# Patient Record
Sex: MALE | Race: WHITE | Employment: FULL TIME | URBAN - NONMETROPOLITAN AREA
[De-identification: names, ages, dates, MRNs, and addresses within clinical notes are randomized per-mention and may not be internally consistent; named-entity substitution may affect disease eponyms.]

---

## 2024-04-03 ENCOUNTER — HOSPITAL ENCOUNTER (EMERGENCY)
Age: 29
Discharge: HOME | End: 2024-04-03
Payer: COMMERCIAL

## 2024-04-03 VITALS
SYSTOLIC BLOOD PRESSURE: 126 MMHG | DIASTOLIC BLOOD PRESSURE: 71 MMHG | RESPIRATION RATE: 16 BRPM | TEMPERATURE: 98.2 F | WEIGHT: 183 LBS | OXYGEN SATURATION: 96 % | HEART RATE: 112 BPM

## 2024-04-03 DIAGNOSIS — Z02.89 ENCOUNTER TO OBTAIN EXCUSE FROM WORK: ICD-10-CM

## 2024-04-03 DIAGNOSIS — F41.1 ANXIETY STATE: Primary | ICD-10-CM

## 2024-04-03 PROCEDURE — 99203 OFFICE O/P NEW LOW 30 MIN: CPT

## 2024-04-03 PROCEDURE — 99213 OFFICE O/P EST LOW 20 MIN: CPT

## 2024-04-03 RX ORDER — DIVALPROEX SODIUM 125 MG/1
125 CAPSULE, COATED PELLETS ORAL 2 TIMES DAILY
COMMUNITY

## 2024-04-03 RX ORDER — BUSPIRONE HYDROCHLORIDE 10 MG/1
10 TABLET ORAL 2 TIMES DAILY
COMMUNITY
Start: 2024-03-04

## 2024-04-03 RX ORDER — HYDROXYZINE PAMOATE 25 MG/1
CAPSULE ORAL
COMMUNITY
Start: 2024-01-03

## 2024-04-03 ASSESSMENT — ENCOUNTER SYMPTOMS
CONSTIPATION: 0
VOMITING: 0
DIARRHEA: 0
SHORTNESS OF BREATH: 0
ABDOMINAL PAIN: 0

## 2024-04-03 ASSESSMENT — PAIN - FUNCTIONAL ASSESSMENT: PAIN_FUNCTIONAL_ASSESSMENT: NONE - DENIES PAIN

## 2024-04-03 NOTE — ED TRIAGE NOTES
Pt to room 2 and states he is need of a work note due to he is anxious and is out of medications for his mental illness and had an appointment at his doctor's office yesterday who \"wanted to admit me but I'm not going\".  \"I just need to get home so I can smoke marijuana because I's the only way I can be calm and get through this\". Pt denies any suicidal thoughts or plans currently but reports that he is having a hard time dealing with \"being human\"

## 2024-04-03 NOTE — ED PROVIDER NOTES
[facility]  Urgent Care Encounter      CHIEF COMPLAINT       Chief Complaint   Patient presents with    Letter for School/Work     anxiety       Nurses Notes reviewed and I agree except as noted in the HPI.  HISTORY OF PRESENT ILLNESS   Adalid Elmore is a 29 y.o. male who presents to urgent care requesting a work excuse.  Patient reports he needed a mental health day today.  Patient reports he called into work and is requesting a work note for excuse all.  Patient reports his anxiety has been very high and lately and he just needed a day off work.  Patient reports he did see his primary care provider yesterday and he did want him to be admitted to the hospital for psychiatric evaluation although patient reports hospitals give him more anxiety.  Patient states \"I am sick mentally.  Work does not need to know I am not physically sick.  I just need an excuse for today.  I have cash to pay for a work slip so I do not lose my job.\"    REVIEW OF SYSTEMS     Review of Systems   Constitutional:  Negative for fatigue and fever.   HENT:  Negative for congestion.    Respiratory:  Negative for cough and shortness of breath.    Gastrointestinal:  Negative for abdominal pain, constipation, diarrhea and vomiting.   Musculoskeletal:  Negative for arthralgias.   Neurological:  Negative for dizziness, seizures, numbness and headaches.   Psychiatric/Behavioral:  Negative for self-injury and suicidal ideas. The patient is nervous/anxious.        PAST MEDICAL HISTORY   History reviewed. No pertinent past medical history.    SURGICAL HISTORY     Patient  has no past surgical history on file.    CURRENT MEDICATIONS       Discharge Medication List as of 4/3/2024 12:29 PM        CONTINUE these medications which have NOT CHANGED    Details   divalproex (DEPAKOTE SPRINKLE) 125 MG DR capsule Take 1 capsule by mouth 2 times dailyHistorical Med      hydrOXYzine pamoate (VISTARIL) 25 MG capsule Historical Med       busPIRone (BUSPAR) 10 MG tablet Take 1 tablet by mouth 2 times dailyHistorical Med             ALLERGIES     Patient is is allergic to pcn [penicillins].    FAMILY HISTORY     Patient'sfamily history is not on file.    SOCIAL HISTORY     Patient  reports that he has been smoking cigarettes. He has never used smokeless tobacco. He reports current drug use. Drug: Marijuana (Weed).    PHYSICAL EXAM     ED TRIAGE VITALS  BP: 126/71, Temp: 98.2 °F (36.8 °C), Pulse: (!) 112, Respirations: 16, SpO2: 96 %  Physical Exam  Vitals and nursing note reviewed.   Constitutional:       Appearance: Normal appearance.   HENT:      Head: Normocephalic and atraumatic.      Nose: Nose normal.   Eyes:      Pupils: Pupils are equal, round, and reactive to light.   Cardiovascular:      Rate and Rhythm: Normal rate and regular rhythm.   Pulmonary:      Effort: Pulmonary effort is normal. No respiratory distress.      Breath sounds: Normal breath sounds. No stridor. No wheezing or rhonchi.   Skin:     General: Skin is warm and dry.      Capillary Refill: Capillary refill takes less than 2 seconds.   Neurological:      General: No focal deficit present.      Mental Status: He is alert and oriented to person, place, and time.   Psychiatric:         Attention and Perception: Attention normal. He is attentive. He does not perceive auditory or visual hallucinations.         Mood and Affect: Mood is anxious.         Behavior: Behavior is withdrawn. Behavior is not aggressive. Behavior is cooperative.         Thought Content: Thought content does not include homicidal or suicidal ideation. Thought content does not include homicidal or suicidal plan.         DIAGNOSTIC RESULTS   Labs:No results found for this visit on 04/03/24.    IMAGING:  No orders to display      URGENT CARE COURSE:     Vitals:    04/03/24 1215   BP: 126/71   Pulse: (!) 112   Resp: 16   Temp: 98.2 °F (36.8 °C)   TempSrc: Oral   SpO2: 96%   Weight: 83 kg (183 lb)

## 2024-04-08 ENCOUNTER — HOSPITAL ENCOUNTER (INPATIENT)
Age: 29
LOS: 7 days | Discharge: HOME | DRG: 753 | End: 2024-04-15
Attending: PSYCHIATRY & NEUROLOGY | Admitting: PSYCHIATRY & NEUROLOGY
Payer: COMMERCIAL

## 2024-04-08 PROBLEM — F41.9 ANXIETY DISORDER, UNSPECIFIED: Chronic | Status: ACTIVE | Noted: 2024-04-08

## 2024-04-08 PROBLEM — F31.9 BIPOLAR 1 DISORDER (HCC): Status: ACTIVE | Noted: 2024-04-08

## 2024-04-08 PROBLEM — F31.13 BIPOLAR I DISORDER, CURRENT OR MOST RECENT EPISODE MANIC, SEVERE (HCC): Status: ACTIVE | Noted: 2024-04-08

## 2024-04-08 PROBLEM — F12.20 CANNABIS USE DISORDER, MODERATE, DEPENDENCE (HCC): Status: ACTIVE | Noted: 2024-04-08

## 2024-04-08 LAB
T4 FREE SERPL-MCNC: 1.2 NG/DL (ref 0.93–1.68)
TSH SERPL DL<=0.005 MIU/L-ACNC: 2.62 UIU/ML (ref 0.4–4.2)

## 2024-04-08 PROCEDURE — 84439 ASSAY OF FREE THYROXINE: CPT

## 2024-04-08 PROCEDURE — 36415 COLL VENOUS BLD VENIPUNCTURE: CPT

## 2024-04-08 PROCEDURE — 1240000000 HC EMOTIONAL WELLNESS R&B

## 2024-04-08 PROCEDURE — 84443 ASSAY THYROID STIM HORMONE: CPT

## 2024-04-08 PROCEDURE — 6370000000 HC RX 637 (ALT 250 FOR IP): Performed by: PSYCHIATRY & NEUROLOGY

## 2024-04-08 RX ORDER — MAGNESIUM HYDROXIDE/ALUMINUM HYDROXICE/SIMETHICONE 120; 1200; 1200 MG/30ML; MG/30ML; MG/30ML
30 SUSPENSION ORAL EVERY 6 HOURS PRN
Status: DISCONTINUED | OUTPATIENT
Start: 2024-04-08 | End: 2024-04-15 | Stop reason: HOSPADM

## 2024-04-08 RX ORDER — POLYETHYLENE GLYCOL 3350 17 G/17G
17 POWDER, FOR SOLUTION ORAL DAILY PRN
Status: DISCONTINUED | OUTPATIENT
Start: 2024-04-08 | End: 2024-04-15 | Stop reason: HOSPADM

## 2024-04-08 RX ORDER — BUSPIRONE HYDROCHLORIDE 10 MG/1
10 TABLET ORAL 2 TIMES DAILY
Status: DISCONTINUED | OUTPATIENT
Start: 2024-04-08 | End: 2024-04-15 | Stop reason: HOSPADM

## 2024-04-08 RX ORDER — POLYETHYLENE GLYCOL 3350 17 G
2 POWDER IN PACKET (EA) ORAL
Status: DISCONTINUED | OUTPATIENT
Start: 2024-04-08 | End: 2024-04-15 | Stop reason: HOSPADM

## 2024-04-08 RX ORDER — LURASIDONE HYDROCHLORIDE 40 MG/1
60 TABLET, FILM COATED ORAL
Status: DISCONTINUED | OUTPATIENT
Start: 2024-04-08 | End: 2024-04-10

## 2024-04-08 RX ORDER — DIVALPROEX SODIUM 500 MG/1
1500 TABLET, EXTENDED RELEASE ORAL NIGHTLY
Status: ON HOLD | COMMUNITY
End: 2024-04-15 | Stop reason: HOSPADM

## 2024-04-08 RX ORDER — DIVALPROEX SODIUM 500 MG/1
1500 TABLET, EXTENDED RELEASE ORAL NIGHTLY
Status: DISCONTINUED | OUTPATIENT
Start: 2024-04-08 | End: 2024-04-15 | Stop reason: HOSPADM

## 2024-04-08 RX ORDER — IBUPROFEN 400 MG/1
400 TABLET ORAL EVERY 6 HOURS PRN
Status: DISCONTINUED | OUTPATIENT
Start: 2024-04-08 | End: 2024-04-15 | Stop reason: HOSPADM

## 2024-04-08 RX ORDER — ACETAMINOPHEN 325 MG/1
650 TABLET ORAL EVERY 4 HOURS PRN
Status: DISCONTINUED | OUTPATIENT
Start: 2024-04-08 | End: 2024-04-15 | Stop reason: HOSPADM

## 2024-04-08 RX ORDER — TRAZODONE HYDROCHLORIDE 50 MG/1
50 TABLET ORAL NIGHTLY PRN
Status: DISCONTINUED | OUTPATIENT
Start: 2024-04-08 | End: 2024-04-12

## 2024-04-08 RX ORDER — HYDROXYZINE HYDROCHLORIDE 25 MG/1
50 TABLET, FILM COATED ORAL 3 TIMES DAILY PRN
Status: DISCONTINUED | OUTPATIENT
Start: 2024-04-08 | End: 2024-04-15 | Stop reason: HOSPADM

## 2024-04-08 RX ADMIN — HYDROXYZINE HYDROCHLORIDE 50 MG: 25 TABLET, FILM COATED ORAL at 09:17

## 2024-04-08 RX ADMIN — TRAZODONE HYDROCHLORIDE 50 MG: 50 TABLET ORAL at 20:15

## 2024-04-08 RX ADMIN — BUSPIRONE HYDROCHLORIDE 10 MG: 10 TABLET ORAL at 13:10

## 2024-04-08 RX ADMIN — NICOTINE POLACRILEX 2 MG: 2 LOZENGE ORAL at 16:52

## 2024-04-08 RX ADMIN — NICOTINE POLACRILEX 2 MG: 2 LOZENGE ORAL at 15:30

## 2024-04-08 RX ADMIN — NICOTINE POLACRILEX 2 MG: 2 LOZENGE ORAL at 20:09

## 2024-04-08 RX ADMIN — NICOTINE POLACRILEX 2 MG: 2 LOZENGE ORAL at 09:17

## 2024-04-08 RX ADMIN — BUSPIRONE HYDROCHLORIDE 10 MG: 10 TABLET ORAL at 20:08

## 2024-04-08 RX ADMIN — DIVALPROEX SODIUM 1500 MG: 500 TABLET, EXTENDED RELEASE ORAL at 20:08

## 2024-04-08 RX ADMIN — HYDROXYZINE HYDROCHLORIDE 50 MG: 25 TABLET, FILM COATED ORAL at 16:22

## 2024-04-08 RX ADMIN — NICOTINE POLACRILEX 2 MG: 2 LOZENGE ORAL at 14:10

## 2024-04-08 ASSESSMENT — SLEEP AND FATIGUE QUESTIONNAIRES
DO YOU USE A SLEEP AID: YES
SLEEP PATTERN: INSOMNIA
AVERAGE NUMBER OF SLEEP HOURS: 0
DO YOU HAVE DIFFICULTY SLEEPING: YES

## 2024-04-08 ASSESSMENT — LIFESTYLE VARIABLES
HOW MANY STANDARD DRINKS CONTAINING ALCOHOL DO YOU HAVE ON A TYPICAL DAY: PATIENT DECLINED
HOW OFTEN DO YOU HAVE A DRINK CONTAINING ALCOHOL: PATIENT DECLINED

## 2024-04-08 ASSESSMENT — PATIENT HEALTH QUESTIONNAIRE - PHQ9
SUM OF ALL RESPONSES TO PHQ QUESTIONS 1-9: 0
2. FEELING DOWN, DEPRESSED OR HOPELESS: NOT AT ALL
SUM OF ALL RESPONSES TO PHQ QUESTIONS 1-9: 0

## 2024-04-09 LAB
BASOPHILS ABSOLUTE: 0 THOU/MM3 (ref 0–0.1)
BASOPHILS NFR BLD AUTO: 0.6 %
DEPRECATED RDW RBC AUTO: 45.8 FL (ref 35–45)
EOSINOPHIL NFR BLD AUTO: 6.2 %
EOSINOPHILS ABSOLUTE: 0.4 THOU/MM3 (ref 0–0.4)
ERYTHROCYTE [DISTWIDTH] IN BLOOD BY AUTOMATED COUNT: 13.3 % (ref 11.5–14.5)
HCT VFR BLD AUTO: 44.5 % (ref 42–52)
HGB BLD-MCNC: 15.1 GM/DL (ref 14–18)
IMM GRANULOCYTES # BLD AUTO: 0.01 THOU/MM3 (ref 0–0.07)
IMM GRANULOCYTES NFR BLD AUTO: 0.2 %
LYMPHOCYTES ABSOLUTE: 2.9 THOU/MM3 (ref 1–4.8)
LYMPHOCYTES NFR BLD AUTO: 46.6 %
MCH RBC QN AUTO: 31.4 PG (ref 26–33)
MCHC RBC AUTO-ENTMCNC: 33.9 GM/DL (ref 32.2–35.5)
MCV RBC AUTO: 92.5 FL (ref 80–94)
MONOCYTES ABSOLUTE: 0.7 THOU/MM3 (ref 0.4–1.3)
MONOCYTES NFR BLD AUTO: 10.6 %
NEUTROPHILS NFR BLD AUTO: 35.8 %
NRBC BLD AUTO-RTO: 0 /100 WBC
PLATELET # BLD AUTO: 210 THOU/MM3 (ref 130–400)
PMV BLD AUTO: 12.5 FL (ref 9.4–12.4)
RBC # BLD AUTO: 4.81 MILL/MM3 (ref 4.7–6.1)
SEGMENTED NEUTROPHILS ABSOLUTE COUNT: 2.3 THOU/MM3 (ref 1.8–7.7)
WBC # BLD AUTO: 6.3 THOU/MM3 (ref 4.8–10.8)

## 2024-04-09 PROCEDURE — 1240000000 HC EMOTIONAL WELLNESS R&B

## 2024-04-09 PROCEDURE — 6370000000 HC RX 637 (ALT 250 FOR IP): Performed by: PSYCHIATRY & NEUROLOGY

## 2024-04-09 PROCEDURE — 36415 COLL VENOUS BLD VENIPUNCTURE: CPT

## 2024-04-09 PROCEDURE — 85025 COMPLETE CBC W/AUTO DIFF WBC: CPT

## 2024-04-09 RX ADMIN — NICOTINE POLACRILEX 2 MG: 2 LOZENGE ORAL at 05:33

## 2024-04-09 RX ADMIN — DIVALPROEX SODIUM 1500 MG: 500 TABLET, EXTENDED RELEASE ORAL at 20:45

## 2024-04-09 RX ADMIN — NICOTINE POLACRILEX 2 MG: 2 LOZENGE ORAL at 18:58

## 2024-04-09 RX ADMIN — NICOTINE POLACRILEX 2 MG: 2 LOZENGE ORAL at 22:52

## 2024-04-09 RX ADMIN — NICOTINE POLACRILEX 2 MG: 2 LOZENGE ORAL at 07:45

## 2024-04-09 RX ADMIN — NICOTINE POLACRILEX 2 MG: 2 LOZENGE ORAL at 13:55

## 2024-04-09 RX ADMIN — HYDROXYZINE HYDROCHLORIDE 50 MG: 25 TABLET, FILM COATED ORAL at 06:53

## 2024-04-09 RX ADMIN — NICOTINE POLACRILEX 2 MG: 2 LOZENGE ORAL at 10:03

## 2024-04-09 RX ADMIN — NICOTINE POLACRILEX 2 MG: 2 LOZENGE ORAL at 02:51

## 2024-04-09 RX ADMIN — BUSPIRONE HYDROCHLORIDE 10 MG: 10 TABLET ORAL at 08:46

## 2024-04-09 RX ADMIN — TRAZODONE HYDROCHLORIDE 50 MG: 50 TABLET ORAL at 20:45

## 2024-04-09 RX ADMIN — BUSPIRONE HYDROCHLORIDE 10 MG: 10 TABLET ORAL at 20:45

## 2024-04-09 RX ADMIN — LURASIDONE HYDROCHLORIDE 60 MG: 40 TABLET, FILM COATED ORAL at 12:51

## 2024-04-09 RX ADMIN — NICOTINE POLACRILEX 2 MG: 2 LOZENGE ORAL at 20:45

## 2024-04-09 RX ADMIN — HYDROXYZINE HYDROCHLORIDE 50 MG: 25 TABLET, FILM COATED ORAL at 02:47

## 2024-04-09 NOTE — PLAN OF CARE
Problem: Risk for Elopement  Goal: Patient will not exit the unit/facility without proper excort  Note: Patient will not exit facility/unit without proper escort.     Problem: Discharge Planning  Goal: Discharge to home or other facility with appropriate resources  Note: Discharge plan in progress.     Problem: Ida  Goal: Will exhibit normal sleep and speech and no impulsivity  Description: INTERVENTIONS:  1. Administer medication as ordered  2. Set limits on impulsive behavior  3. Make attempts to decrease external stimuli as possible  Note: Patient is hyper verbal, has pressured speech and flight of ideas.     Problem: Psychosis  Goal: Will report no hallucinations or delusions  Description: INTERVENTIONS:  1. Administer medication as  ordered  2. Assist with reality testing to support increasing orientation  3. Assess if patient's hallucinations or delusions are encouraging self harm or harm to others and intervene as appropriate  Note: Patient denies hallucination.     Problem: Anxiety  Goal: Will report anxiety at manageable levels  Description: INTERVENTIONS:  1. Administer medication as ordered  2. Teach and rehearse alternative coping skills  3. Provide emotional support with 1:1 interaction with staff  Note: Patient admits to being anxious.     Problem: Drug Abuse/Detox  Goal: Will have no detox symptoms and will verbalize plan for changing drug-related behavior  Description: INTERVENTIONS:  1. Administer medication as ordered  2. Monitor physical status  3. Provide emotional support with 1:1 interaction with staff  4. Encourage  recovery focused treatment   Note: Patient will have no detox symptoms and will verbalize plan for changing drug-related behavior     Problem: Sleep Disturbance  Goal: Will exhibit normal sleeping pattern  Description: INTERVENTIONS:  1. Administer medication as ordered  2. Decrease environmental stimuli, including noise, as appropriate  3. Discourage social isolation and naps

## 2024-04-09 NOTE — H&P
psychotropics.    RECOMMENDATIONS:    1.  Admit to the unit.  2. Routine labs ordered.  3. Resume Depakote, buspirone, and hydroxyzine.  Start lurasidone.  4. Risks and benefits of psychotropics discussed as well as alternative treatment.   5. Support and reassurance given.   6. Milieu and group therapy to develop insight to psychiatric illness and better coping mechanism.  7. Upon discharge, he will be referred for outpatient management.    PATIENT'S STRENGTHS:  None identified.          LOPEZ SIMEON MD      D:  04/08/2024 21:40:36     T:  04/09/2024 01:15:44     MARCO ANTONIO/NOEMI  Job #:  [id number]     Doc#:  [id number]

## 2024-04-09 NOTE — PLAN OF CARE
Problem: Drug Abuse/Detox  Goal: Will have no detox symptoms and will verbalize plan for changing drug-related behavior  Description: INTERVENTIONS:  1. Administer medication as ordered  2. Monitor physical status  3. Provide emotional support with 1:1 interaction with staff  4. Encourage  recovery focused treatment   Outcome: Not Progressing  Flowsheets (Taken 4/8/2024 2129)  Will have no detox symptoms and will verbalize plan for changing drug-related behavior: Monitor physical status  Note: Patient fixated on using marijuana for anxiety, reports \"I always make sure that I have it, and make sure that I have the money for it\"      Problem: Risk for Elopement  Goal: Patient will not exit the unit/facility without proper excort  Outcome: Progressing  Flowsheets  Taken 4/8/2024 1409 by Soledad Mishra, RN  Nursing Interventions for Elopement Risk:   Reduce environmental triggers   Make sure patient has all necessary personal care items  Taken 4/8/2024 1359 by Soledad Mishra, RN  Nursing Interventions for Elopement Risk:   Reduce environmental triggers   Make sure patient has all necessary personal care items  Note: Patient has not made any elopement attempts so far this shift. Patient provided with necessary personal care items.      Problem: Discharge Planning  Goal: Discharge to home or other facility with appropriate resources  Outcome: Progressing     Problem: Ida  Goal: Will exhibit normal sleep and speech and no impulsivity  Description: INTERVENTIONS:  1. Administer medication as ordered  2. Set limits on impulsive behavior  3. Make attempts to decrease external stimuli as possible  Outcome: Progressing  Note: Patient remains impulsive, with pressured speech. Patient compliant with medications prescribed.      Problem: Psychosis  Goal: Will report no hallucinations or delusions  Description: INTERVENTIONS:  1. Administer medication as  ordered  2. Assist with reality testing to support increasing

## 2024-04-10 PROCEDURE — 1240000000 HC EMOTIONAL WELLNESS R&B

## 2024-04-10 PROCEDURE — 6370000000 HC RX 637 (ALT 250 FOR IP): Performed by: PSYCHIATRY & NEUROLOGY

## 2024-04-10 RX ORDER — LURASIDONE HYDROCHLORIDE 40 MG/1
80 TABLET, FILM COATED ORAL
Status: DISCONTINUED | OUTPATIENT
Start: 2024-04-10 | End: 2024-04-15 | Stop reason: HOSPADM

## 2024-04-10 RX ADMIN — BUSPIRONE HYDROCHLORIDE 10 MG: 10 TABLET ORAL at 09:59

## 2024-04-10 RX ADMIN — NICOTINE POLACRILEX 2 MG: 2 LOZENGE ORAL at 20:36

## 2024-04-10 RX ADMIN — NICOTINE POLACRILEX 2 MG: 2 LOZENGE ORAL at 13:18

## 2024-04-10 RX ADMIN — NICOTINE POLACRILEX 2 MG: 2 LOZENGE ORAL at 18:33

## 2024-04-10 RX ADMIN — NICOTINE POLACRILEX 2 MG: 2 LOZENGE ORAL at 11:12

## 2024-04-10 RX ADMIN — NICOTINE POLACRILEX 2 MG: 2 LOZENGE ORAL at 09:46

## 2024-04-10 RX ADMIN — DIVALPROEX SODIUM 1500 MG: 500 TABLET, EXTENDED RELEASE ORAL at 19:52

## 2024-04-10 RX ADMIN — TRAZODONE HYDROCHLORIDE 50 MG: 50 TABLET ORAL at 19:52

## 2024-04-10 RX ADMIN — BUSPIRONE HYDROCHLORIDE 10 MG: 10 TABLET ORAL at 19:52

## 2024-04-10 RX ADMIN — NICOTINE POLACRILEX 2 MG: 2 LOZENGE ORAL at 15:18

## 2024-04-10 RX ADMIN — LURASIDONE HYDROCHLORIDE 80 MG: 40 TABLET, FILM COATED ORAL at 12:01

## 2024-04-10 NOTE — PATIENT CARE CONFERENCE
Behavioral Health   Day 3 Interdisciplinary Treatment Plan NOTE    Review Date & Time: 4/10/24 1546    Patient was in treatment team    Admission Type:   Admission Type: Involuntary    Reason for admission:  Reason for Admission: \"Because Dr. Esquivel says so.\"  Estimated Length of Stay Update:  2-3 days  Estimated Discharge Date Update: 4/14/24    Patient Strengths/Barriers  Strengths (Must Choose Two): Stable housing, Support from family  Barriers: Support of organized community, Independent living  Addictive Behavior:Addictive Behavior  In the Past 3 Months, Have You Felt or Has Someone Told You That You Have a Problem With  : Other (comment) (unable to assess at this time)  Medical Problems:No past medical history on file.    Risk:  Fall Risk   Faustino Scale Faustino Scale Score: 23    Status EXAM:   Mental Status and Behavioral Exam  Normal: No  Level of Assistance: Independent/Self  Facial Expression: Elevated  Affect: Unstable  Level of Consciousness: Alert  Frequency of Checks: 4 times per hour, close  Mood:Normal: No  Mood: Anxious, Irritable, Depressed  Motor Activity:Normal: Yes  Motor Activity: Increased  Eye Contact: Fair  Observed Behavior: Withdrawn  Sexual Misconduct History: Current - no  Preception: [place] to person, [place] to time, [place] to place, [place] to situation  Attention:Normal: No  Attention: Hyperalert  Thought Processes: Flight of ideas  Thought Content:Normal: No  Thought Content: Paranoia  Depression Symptoms: Change in energy level, Isolative  Anxiety Symptoms: Generalized  Ida Symptoms: Flight of ideas, Less need to sleep, Pressured speech  Hallucinations: None  Delusions: Yes  Delusions: Paranoid  Memory:Normal: No  Memory: Confabulation  Insight and Judgment: No  Insight and Judgment: Poor judgment, Poor insight    Daily Assessment Last Entry:   Daily Sleep (WDL): Within Defined Limits            Daily Nutrition (WDL): Within Defined Limits  Level of Assistance:

## 2024-04-10 NOTE — PLAN OF CARE
Problem: Risk for Elopement  Goal: Patient will not exit the unit/facility without proper excort  4/9/2024 2107 by Jeannine Roberson RN  Outcome: Progressing  Flowsheets (Taken 4/9/2024 1414 by Asaf Flores)  Nursing Interventions for Elopement Risk:   Reduce environmental triggers   Make sure patient has all necessary personal care items  Note: Patient isolative to room and has not made attempts to elope.  4/9/2024 1345 by Asaf Flores  Note: Patient will not exit facility/unit without proper escort.     Problem: Discharge Planning  Goal: Discharge to home or other facility with appropriate resources  4/9/2024 2107 by Jeannine Roberson RN  Outcome: Progressing  Note: D/C planning in progress  4/9/2024 1345 by Asaf Flores  Note: Discharge plan in progress.     Problem: Ida  Goal: Will exhibit normal sleep and speech and no impulsivity  Description: INTERVENTIONS:  1. Administer medication as ordered  2. Set limits on impulsive behavior  3. Make attempts to decrease external stimuli as possible  4/9/2024 2107 by Jeannine Roberson RN  Outcome: Progressing  Note: Patient has not exhibited impulsive behaviors during this shift at this time. Patient has been isolative to room and resting in room with lights dimmed.  4/9/2024 1345 by Asaf Flores  Note: Patient is hyper verbal, has pressured speech and flight of ideas.     Problem: Psychosis  Goal: Will report no hallucinations or delusions  Description: INTERVENTIONS:  1. Administer medication as  ordered  2. Assist with reality testing to support increasing orientation  3. Assess if patient's hallucinations or delusions are encouraging self harm or harm to others and intervene as appropriate  4/9/2024 2107 by Jeannine Roberson RN  Outcome: Progressing  Note: Patient denies hallucinations, patient does continue to exhibit paranoia.  4/9/2024 1345 by Aasf Flores  Note: Patient denies hallucination.     Problem: Anxiety  Goal: Will report anxiety at manageable

## 2024-04-10 NOTE — PLAN OF CARE
Problem: Risk for Elopement  Goal: Patient will not exit the unit/facility without proper excort  Outcome: Progressing  Flowsheets (Taken 4/10/2024 1236 by Asaf Flores)  Nursing Interventions for Elopement Risk:   Reduce environmental triggers   Make sure patient has all necessary personal care items  Note: Patient has not been observed to be checking the exit doors or demonstrating behaviors of attempting to leave the unit.       Problem: Discharge Planning  Goal: Discharge to home or other facility with appropriate resources  Outcome: Progressing  Note: Discharge planning in process.      Problem: Ida  Goal: Will exhibit normal sleep and speech and no impulsivity  Description: INTERVENTIONS:  1. Administer medication as ordered  2. Set limits on impulsive behavior  3. Make attempts to decrease external stimuli as possible  Outcome: Progressing  Note: Patient slept 6 hours continuously last night. Patient not as intrusive and irritable as he was yesterday.      Problem: Psychosis  Goal: Will report no hallucinations or delusions  Description: INTERVENTIONS:  1. Administer medication as  ordered  2. Assist with reality testing to support increasing orientation  3. Assess if patient's hallucinations or delusions are encouraging self harm or harm to others and intervene as appropriate  Outcome: Progressing  Note: Denies any hallucinations.      Problem: Anxiety  Goal: Will report anxiety at manageable levels  Description: INTERVENTIONS:  1. Administer medication as ordered  2. Teach and rehearse alternative coping skills  3. Provide emotional support with 1:1 interaction with staff  Outcome: Progressing  Note: Reports having anxiety.      Problem: Drug Abuse/Detox  Goal: Will have no detox symptoms and will verbalize plan for changing drug-related behavior  Description: INTERVENTIONS:  1. Administer medication as ordered  2. Monitor physical status  3. Provide emotional support with 1:1 interaction with

## 2024-04-11 PROCEDURE — 1240000000 HC EMOTIONAL WELLNESS R&B

## 2024-04-11 PROCEDURE — 6370000000 HC RX 637 (ALT 250 FOR IP): Performed by: PSYCHIATRY & NEUROLOGY

## 2024-04-11 RX ADMIN — NICOTINE POLACRILEX 2 MG: 2 LOZENGE ORAL at 15:25

## 2024-04-11 RX ADMIN — BUSPIRONE HYDROCHLORIDE 10 MG: 10 TABLET ORAL at 08:02

## 2024-04-11 RX ADMIN — NICOTINE POLACRILEX 2 MG: 2 LOZENGE ORAL at 13:05

## 2024-04-11 RX ADMIN — NICOTINE POLACRILEX 2 MG: 2 LOZENGE ORAL at 11:17

## 2024-04-11 RX ADMIN — NICOTINE POLACRILEX 2 MG: 2 LOZENGE ORAL at 08:02

## 2024-04-11 RX ADMIN — DIVALPROEX SODIUM 1500 MG: 500 TABLET, EXTENDED RELEASE ORAL at 21:06

## 2024-04-11 RX ADMIN — HYDROXYZINE HYDROCHLORIDE 50 MG: 25 TABLET, FILM COATED ORAL at 21:09

## 2024-04-11 RX ADMIN — HYDROXYZINE HYDROCHLORIDE 50 MG: 25 TABLET, FILM COATED ORAL at 08:02

## 2024-04-11 RX ADMIN — TRAZODONE HYDROCHLORIDE 50 MG: 50 TABLET ORAL at 21:08

## 2024-04-11 RX ADMIN — NICOTINE POLACRILEX 2 MG: 2 LOZENGE ORAL at 17:23

## 2024-04-11 RX ADMIN — LURASIDONE HYDROCHLORIDE 80 MG: 40 TABLET, FILM COATED ORAL at 11:17

## 2024-04-11 RX ADMIN — HYDROXYZINE HYDROCHLORIDE 50 MG: 25 TABLET, FILM COATED ORAL at 18:55

## 2024-04-11 RX ADMIN — BUSPIRONE HYDROCHLORIDE 10 MG: 10 TABLET ORAL at 23:53

## 2024-04-11 RX ADMIN — NICOTINE POLACRILEX 2 MG: 2 LOZENGE ORAL at 19:37

## 2024-04-11 NOTE — PLAN OF CARE
Problem: Risk for Elopement  Goal: Patient will not exit the unit/facility without proper excort  4/11/2024 0024 by Marquis Basurto RN  Outcome: Progressing  Flowsheets (Taken 4/11/2024 0024)  Nursing Interventions for Elopement Risk: Make sure patient has all necessary personal care items  Note: Patient has not been observed to be checking the exit doors or demonstrating behaviors of attempting to leave the unit.   4/10/2024 1302 by Ninoska Villalba RN  Outcome: Progressing  Flowsheets (Taken 4/10/2024 1236 by Asaf Flores)  Nursing Interventions for Elopement Risk:   Reduce environmental triggers   Make sure patient has all necessary personal care items  Note: Patient has not been observed to be checking the exit doors or demonstrating behaviors of attempting to leave the unit.       Problem: Discharge Planning  Goal: Discharge to home or other facility with appropriate resources  4/11/2024 0024 by Marquis Basurto RN  Outcome: Progressing  Flowsheets (Taken 4/11/2024 0024)  Discharge to home or other facility with appropriate resources:   Identify barriers to discharge with patient and caregiver   Identify discharge learning needs (meds, wound care, etc)   Arrange for needed discharge resources and transportation as appropriate  4/10/2024 1302 by Ninoska Villalba RN  Outcome: Progressing  Note: Discharge planning in process.      Problem: Ida  Goal: Will exhibit normal sleep and speech and no impulsivity  Description: INTERVENTIONS:  1. Administer medication as ordered  2. Set limits on impulsive behavior  3. Make attempts to decrease external stimuli as possible  4/11/2024 0024 by Marquis Basurto RN  Outcome: Progressing  4/10/2024 1302 by Ninoska Villalba RN  Outcome: Progressing  Note: Patient slept 6 hours continuously last night. Patient not as intrusive and irritable as he was yesterday.      Problem: Psychosis  Goal: Will report no hallucinations or delusions  Description: INTERVENTIONS:  1.

## 2024-04-11 NOTE — PLAN OF CARE
Problem: Discharge Planning  Goal: Discharge to home or other facility with appropriate resources  4/11/2024 1138 by Melina Canada RN  Outcome: Not Progressing  Flowsheets (Taken 4/11/2024 0800)  Discharge to home or other facility with appropriate resources: Identify barriers to discharge with patient and caregiver  Note: Patient not discharged this shift. Patient continues to work with care team toward discharge goal.   4/11/2024 0024 by Marquis Basurto RN  Outcome: Progressing  Flowsheets (Taken 4/11/2024 0024)  Discharge to home or other facility with appropriate resources:   Identify barriers to discharge with patient and caregiver   Identify discharge learning needs (meds, wound care, etc)   Arrange for needed discharge resources and transportation as appropriate     Problem: Ida  Goal: Will exhibit normal sleep and speech and no impulsivity  Description: INTERVENTIONS:  1. Administer medication as ordered  2. Set limits on impulsive behavior  3. Make attempts to decrease external stimuli as possible  4/11/2024 1138 by Melina Canada RN  Outcome: Not Progressing  Note: 5.5 broken sleep. Rapid speech. Does not appear impulsive this shift.   4/11/2024 0024 by Marquis Basurto RN  Outcome: Progressing     Problem: Anxiety  Goal: Will report anxiety at manageable levels  Description: INTERVENTIONS:  1. Administer medication as ordered  2. Teach and rehearse alternative coping skills  3. Provide emotional support with 1:1 interaction with staff  4/11/2024 1138 by Melina Canada RN  Outcome: Not Progressing  Flowsheets (Taken 4/11/2024 0800)  Will report anxiety at manageable levels:   Administer medication as ordered   Provide emotional support with 1:1 interaction with staff  Note: Patient reports continued anxiety this shift.   4/11/2024 0024 by Marquis Basurto, RN  Outcome: Not Progressing  Flowsheets (Taken 4/11/2024 0024)  Will report anxiety at manageable levels:   Administer medication as ordered   Provide

## 2024-04-11 NOTE — GROUP NOTE
Group Therapy Note    Date: 4/11/2024    Group Start Time: 1130  Group End Time: 1200  Group Topic: Healthy Living/Wellness    [facility]    Rosalia Smith LPN        Group Therapy Note    Attendees: 3       Notes:  attended    Status After Intervention:  Improved    Participation Level: Active Listener and Interactive    Participation Quality: Appropriate and Attentive      Speech:  normal      Thought Process/Content: Logical      Affective Functioning: Flat      Level of consciousness:  Alert, Oriented x4, and Attentive      Response to Learning: Able to verbalize current knowledge/experience, Able to verbalize/acknowledge new learning, Able to retain information, and Capable of insight      Endings: None Reported    Modes of Intervention: Education and Socialization      Discipline Responsible:Licensed Practical Nurse      Signature:  Rosalia Smith LPN

## 2024-04-12 PROCEDURE — 1240000000 HC EMOTIONAL WELLNESS R&B

## 2024-04-12 PROCEDURE — 6370000000 HC RX 637 (ALT 250 FOR IP): Performed by: PSYCHIATRY & NEUROLOGY

## 2024-04-12 RX ORDER — TRAZODONE HYDROCHLORIDE 100 MG/1
100 TABLET ORAL NIGHTLY PRN
Status: DISCONTINUED | OUTPATIENT
Start: 2024-04-12 | End: 2024-04-15 | Stop reason: HOSPADM

## 2024-04-12 RX ADMIN — HYDROXYZINE HYDROCHLORIDE 50 MG: 25 TABLET, FILM COATED ORAL at 08:05

## 2024-04-12 RX ADMIN — BUSPIRONE HYDROCHLORIDE 10 MG: 10 TABLET ORAL at 20:43

## 2024-04-12 RX ADMIN — NICOTINE POLACRILEX 2 MG: 2 LOZENGE ORAL at 10:10

## 2024-04-12 RX ADMIN — NICOTINE POLACRILEX 2 MG: 2 LOZENGE ORAL at 21:50

## 2024-04-12 RX ADMIN — BUSPIRONE HYDROCHLORIDE 10 MG: 10 TABLET ORAL at 08:05

## 2024-04-12 RX ADMIN — DIVALPROEX SODIUM 1500 MG: 500 TABLET, EXTENDED RELEASE ORAL at 20:43

## 2024-04-12 RX ADMIN — NICOTINE POLACRILEX 2 MG: 2 LOZENGE ORAL at 15:01

## 2024-04-12 RX ADMIN — LURASIDONE HYDROCHLORIDE 80 MG: 40 TABLET, FILM COATED ORAL at 12:36

## 2024-04-12 RX ADMIN — NICOTINE POLACRILEX 2 MG: 2 LOZENGE ORAL at 08:05

## 2024-04-12 RX ADMIN — HYDROXYZINE HYDROCHLORIDE 50 MG: 25 TABLET, FILM COATED ORAL at 20:06

## 2024-04-12 RX ADMIN — NICOTINE POLACRILEX 2 MG: 2 LOZENGE ORAL at 19:29

## 2024-04-12 RX ADMIN — NICOTINE POLACRILEX 2 MG: 2 LOZENGE ORAL at 12:37

## 2024-04-12 RX ADMIN — TRAZODONE HYDROCHLORIDE 100 MG: 100 TABLET ORAL at 20:43

## 2024-04-12 NOTE — PLAN OF CARE
Problem: Risk for Elopement  Goal: Patient will not exit the unit/facility without proper excort  4/12/2024 0129 by Angelina Armando LPN  Outcome: Progressing  Flowsheets (Taken 4/11/2024 2144)  Nursing Interventions for Elopement Risk:   Reduce environmental triggers   Assist with personal care needs such as toileting, eating, dressing, as needed to reduce the risk of wandering   Make sure patient has all necessary personal care items  Note: Patient has made no attempt to exit the unit or facility so far this shift.   4/11/2024 1138 by Melina Canada, RN  Outcome: Progressing  Flowsheets (Taken 4/11/2024 0800)  Nursing Interventions for Elopement Risk: Make sure patient has all necessary personal care items  Note: Patient does not leave unit this shift.        Problem: Discharge Planning  Goal: Discharge to home or other facility with appropriate resources  4/12/2024 0129 by Angelina Armando LPN  Outcome: Progressing  Flowsheets (Taken 4/11/2024 2144)  Discharge to home or other facility with appropriate resources:   Identify barriers to discharge with patient and caregiver   Identify discharge learning needs (meds, wound care, etc)  Note: Patient was not discharged this shift. Patient continues to work with care team toward discharge goal.  4/11/2024 1138 by Melina Canada RN  Outcome: Not Progressing  Flowsheets (Taken 4/11/2024 0800)  Discharge to home or other facility with appropriate resources: Identify barriers to discharge with patient and caregiver  Note: Patient not discharged this shift. Patient continues to work with care team toward discharge goal.      Problem: Ida  Goal: Will exhibit normal sleep and speech and no impulsivity  Description: INTERVENTIONS:  1. Administer medication as ordered  2. Set limits on impulsive behavior  3. Make attempts to decrease external stimuli as possible  4/12/2024 0129 by Angelina Armando LPN  Outcome: Not Progressing  Note: Patient has been awake most of shift so

## 2024-04-12 NOTE — PLAN OF CARE
Problem: Discharge Planning  Goal: Discharge to home or other facility with appropriate resources  4/12/2024 1322 by Melina Canada RN  Outcome: Not Progressing  Flowsheets (Taken 4/12/2024 0800)  Discharge to home or other facility with appropriate resources:   Identify barriers to discharge with patient and caregiver   Identify discharge learning needs (meds, wound care, etc)  Note: Patient not discharged this shift. Patient continues to work with care team toward discharge goal.   4/12/2024 0129 by Angelina Armando LPN  Outcome: Progressing  Flowsheets (Taken 4/11/2024 2144)  Discharge to home or other facility with appropriate resources:   Identify barriers to discharge with patient and caregiver   Identify discharge learning needs (meds, wound care, etc)  Note: Patient was not discharged this shift. Patient continues to work with care team toward discharge goal.     Problem: Ida  Goal: Will exhibit normal sleep and speech and no impulsivity  Description: INTERVENTIONS:  1. Administer medication as ordered  2. Set limits on impulsive behavior  3. Make attempts to decrease external stimuli as possible  4/12/2024 1322 by Melina Canada RN  Outcome: Not Progressing  Note: 4.5B  4/12/2024 0129 by Angelina Armando LPN  Outcome: Not Progressing  Note: Patient has been awake most of shift so far. Pts speech is normal and no impulsivity observed. Patient was given PRN Atarax and Trazodone per request.      Problem: Sleep Disturbance  Goal: Will exhibit normal sleeping pattern  Description: INTERVENTIONS:  1. Administer medication as ordered  2. Decrease environmental stimuli, including noise, as appropriate  3. Discourage social isolation and naps during the day  4/12/2024 1322 by Melina Canada RN  Outcome: Not Progressing  Note: 4.5B  4/12/2024 0129 by Angelina Armando LPN  Outcome: Not Progressing  Note: Patient has been awake most of shift so far.      Problem: Risk for Elopement  Goal: Patient will not exit the

## 2024-04-13 PROCEDURE — 6370000000 HC RX 637 (ALT 250 FOR IP): Performed by: PSYCHIATRY & NEUROLOGY

## 2024-04-13 PROCEDURE — 1240000000 HC EMOTIONAL WELLNESS R&B

## 2024-04-13 RX ORDER — DOXEPIN 3 MG/1
3 TABLET, FILM COATED ORAL ONCE
Status: COMPLETED | OUTPATIENT
Start: 2024-04-13 | End: 2024-04-13

## 2024-04-13 RX ORDER — DOXEPIN HYDROCHLORIDE 50 MG/1
50 CAPSULE ORAL NIGHTLY
Status: DISCONTINUED | OUTPATIENT
Start: 2024-04-13 | End: 2024-04-15 | Stop reason: HOSPADM

## 2024-04-13 RX ORDER — DOXEPIN HYDROCHLORIDE 50 MG/1
50 CAPSULE ORAL NIGHTLY PRN
Status: DISCONTINUED | OUTPATIENT
Start: 2024-04-13 | End: 2024-04-15 | Stop reason: HOSPADM

## 2024-04-13 RX ADMIN — BUSPIRONE HYDROCHLORIDE 10 MG: 10 TABLET ORAL at 08:27

## 2024-04-13 RX ADMIN — NICOTINE POLACRILEX 2 MG: 2 LOZENGE ORAL at 02:09

## 2024-04-13 RX ADMIN — LURASIDONE HYDROCHLORIDE 80 MG: 40 TABLET, FILM COATED ORAL at 12:44

## 2024-04-13 RX ADMIN — DOXEPIN HYDROCHLORIDE 3 MG: 3 TABLET ORAL at 02:44

## 2024-04-13 RX ADMIN — NICOTINE POLACRILEX 2 MG: 2 LOZENGE ORAL at 04:53

## 2024-04-13 RX ADMIN — NICOTINE POLACRILEX 2 MG: 2 LOZENGE ORAL at 21:03

## 2024-04-13 RX ADMIN — NICOTINE POLACRILEX 2 MG: 2 LOZENGE ORAL at 00:09

## 2024-04-13 RX ADMIN — NICOTINE POLACRILEX 2 MG: 2 LOZENGE ORAL at 14:05

## 2024-04-13 RX ADMIN — DIVALPROEX SODIUM 1500 MG: 500 TABLET, EXTENDED RELEASE ORAL at 22:22

## 2024-04-13 RX ADMIN — BUSPIRONE HYDROCHLORIDE 10 MG: 10 TABLET ORAL at 22:22

## 2024-04-13 RX ADMIN — NICOTINE POLACRILEX 2 MG: 2 LOZENGE ORAL at 08:27

## 2024-04-13 RX ADMIN — DOXEPIN HYDROCHLORIDE 50 MG: 50 CAPSULE ORAL at 22:22

## 2024-04-13 NOTE — PLAN OF CARE
Problem: Discharge Planning  Goal: Discharge to home or other facility with appropriate resources  4/12/2024 2308 by Sobeida Rivera, RN  Outcome: Progressing  Note: Patient plans to return home with his grandparents and follow with Bartolome  4/12/2024 1322 by Melina Canada, RN  Outcome: Not Progressing  Flowsheets (Taken 4/12/2024 0800)  Discharge to home or other facility with appropriate resources:   Identify barriers to discharge with patient and caregiver   Identify discharge learning needs (meds, wound care, etc)  Note: Patient not discharged this shift. Patient continues to work with care team toward discharge goal.      Problem: Ida  Goal: Will exhibit normal sleep and speech and no impulsivity  Description: INTERVENTIONS:  1. Administer medication as ordered  2. Set limits on impulsive behavior  3. Make attempts to decrease external stimuli as possible  4/12/2024 2308 by Sobeida Rivera, RN  Outcome: Not Progressing  Note: Patient continues to sleep poorly but control impulses  4/12/2024 1322 by Melina Canada, RN  Outcome: Not Progressing  Note: 4.5B     Problem: Sleep Disturbance  Goal: Will exhibit normal sleeping pattern  Description: INTERVENTIONS:  1. Administer medication as ordered  2. Decrease environmental stimuli, including noise, as appropriate  3. Discourage social isolation and naps during the day  4/12/2024 2308 by Sobeida Rivera, RN  Outcome: Progressing  Note: Pt resting quietly with no distress noted  4/12/2024 1322 by Melina Canada, RN  Outcome: Not Progressing  Note: 4.5B   Care plan reviewed with patient and patient verbalized understanding of the plan of care and contribute to goal setting.

## 2024-04-13 NOTE — BH NOTE
Group Therapy Note    Date: 4/12/2024  Start Time: 2000  End Time:  2030  Number of Participants: 1    Type of Group: Wrap-Up    Wellness Binder Information  Module Name:    Session Number:      Patient's Goal:  make the days the best and go fast    Notes:  workig on goal    Status After Intervention:  Unchanged    Participation Level: Active Listener    Participation Quality: Intrusive      Speech:  pressured      Thought Process/Content: Linear      Affective Functioning: Exaggerated      Mood: anxious      Level of consciousness:  Oriented x4      Response to Learning: Able to retain information      Endings: None Reported    Modes of Intervention: Education      Discipline Responsible: Registered Nurse      Signature:  Sobeida Rivera RN

## 2024-04-13 NOTE — PLAN OF CARE
Problem: Risk for Elopement  Goal: Patient will not exit the unit/facility without proper excort  4/13/2024 1010 by Lesia Howard RN  Outcome: Progressing  Flowsheets (Taken 4/13/2024 0744)  Nursing Interventions for Elopement Risk: Assist with personal care needs such as toileting, eating, dressing, as needed to reduce the risk of wandering  Note: Patient has not been observed to be checking the exit doors or demonstrating behaviors of attempting to leave the unit.       Problem: Discharge Planning  Goal: Discharge to home or other facility with appropriate resources  4/13/2024 1010 by Lesia Howard RN  Outcome: Progressing  Flowsheets (Taken 4/13/2024 0744)  Discharge to home or other facility with appropriate resources: Identify barriers to discharge with patient and caregiver  Note: Patient voices no needs before discharge. Patient plans to be discharged to home with grandparents. Discharge planner working with patient to achieve optimal discharge plans, specific to individual needs.       Problem: Psychosis  Goal: Will report no hallucinations or delusions  Description: INTERVENTIONS:  1. Administer medication as  ordered  2. Assist with reality testing to support increasing orientation  3. Assess if patient's hallucinations or delusions are encouraging self harm or harm to others and intervene as appropriate  4/13/2024 1010 by Lesia Howard RN  Outcome: Progressing  Note: Patient denies hallucinations or delusions so far this shift.       Problem: Anxiety  Goal: Will report anxiety at manageable levels  Description: INTERVENTIONS:  1. Administer medication as ordered  2. Teach and rehearse alternative coping skills  3. Provide emotional support with 1:1 interaction with staff  4/13/2024 1010 by Lesia Howard RN  Outcome: Progressing  Flowsheets  Taken 4/13/2024 1010  Will report anxiety at manageable levels:   Teach and rehearse alternative coping skills   Provide emotional support with 1:1

## 2024-04-13 NOTE — GROUP NOTE
Group Therapy Note    Date: 4/13/2024    Group Start Time: 0915  Group End Time: 1000  Group Topic: Community Meeting    [facility]    Renee Shultz        Group Therapy Note    Attendees: Patients shared their goals for today & discussed issues on the unit.       Patient's Goal:  To stay positive.    Notes:  Patient was fully engaged in today's discussion. Patient was offered assistance & peer support to reach his goal.    Status After Intervention:  Improved    Participation Level: Active Listener and Interactive    Participation Quality: Appropriate, Attentive, and Sharing      Speech:  normal      Thought Process/Content: Linear      Affective Functioning: Congruent      Mood: euthymic      Level of consciousness:  Alert and Attentive      Response to Learning: Able to verbalize current knowledge/experience, Able to verbalize/acknowledge new learning, Capable of insight, Able to change behavior, and Progressing to goal      Endings: None Reported    Modes of Intervention: Education, Support, and Problem-solving      Discipline Responsible: Behavorial Health Tech      Signature:  Renee Shultz

## 2024-04-13 NOTE — BH NOTE
Patient has not slept well tonight, he has been sitting up in his room or waiting at the door for staff to come around for rounds. Patient repeatedly states that he can not sleep, and does not sleep at night. He states he slept 2 hours during the yesterday and states either Seroquel or getting home to his \"weed\" is the only thing that will work to help him sleep. Dr Esquivel called and informed of patient not sleeping and new orders recieved

## 2024-04-14 LAB — VALPROATE SERPL-MCNC: 52 UG/ML (ref 50–100)

## 2024-04-14 PROCEDURE — 80164 ASSAY DIPROPYLACETIC ACD TOT: CPT

## 2024-04-14 PROCEDURE — 6370000000 HC RX 637 (ALT 250 FOR IP): Performed by: PSYCHIATRY & NEUROLOGY

## 2024-04-14 PROCEDURE — 1240000000 HC EMOTIONAL WELLNESS R&B

## 2024-04-14 PROCEDURE — 36415 COLL VENOUS BLD VENIPUNCTURE: CPT

## 2024-04-14 RX ADMIN — BUSPIRONE HYDROCHLORIDE 10 MG: 10 TABLET ORAL at 07:44

## 2024-04-14 RX ADMIN — NICOTINE POLACRILEX 2 MG: 2 LOZENGE ORAL at 07:44

## 2024-04-14 RX ADMIN — NICOTINE POLACRILEX 2 MG: 2 LOZENGE ORAL at 15:25

## 2024-04-14 RX ADMIN — NICOTINE POLACRILEX 2 MG: 2 LOZENGE ORAL at 20:29

## 2024-04-14 RX ADMIN — DIVALPROEX SODIUM 1500 MG: 500 TABLET, EXTENDED RELEASE ORAL at 20:29

## 2024-04-14 RX ADMIN — HYDROXYZINE HYDROCHLORIDE 50 MG: 25 TABLET, FILM COATED ORAL at 07:44

## 2024-04-14 RX ADMIN — DOXEPIN HYDROCHLORIDE 50 MG: 50 CAPSULE ORAL at 20:28

## 2024-04-14 RX ADMIN — NICOTINE POLACRILEX 2 MG: 2 LOZENGE ORAL at 12:14

## 2024-04-14 RX ADMIN — HYDROXYZINE HYDROCHLORIDE 50 MG: 25 TABLET, FILM COATED ORAL at 13:13

## 2024-04-14 RX ADMIN — HYDROXYZINE HYDROCHLORIDE 50 MG: 25 TABLET, FILM COATED ORAL at 20:29

## 2024-04-14 RX ADMIN — BUSPIRONE HYDROCHLORIDE 10 MG: 10 TABLET ORAL at 20:29

## 2024-04-14 RX ADMIN — LURASIDONE HYDROCHLORIDE 80 MG: 40 TABLET, FILM COATED ORAL at 12:15

## 2024-04-14 NOTE — PLAN OF CARE
Problem: Risk for Elopement  Goal: Patient will not exit the unit/facility without proper excort  4/14/2024 0936 by Lesia Howard RN  Outcome: Progressing  Flowsheets (Taken 4/14/2024 0739)  Nursing Interventions for Elopement Risk: Assist with personal care needs such as toileting, eating, dressing, as needed to reduce the risk of wandering  Note: Patient has not been observed to be checking the exit doors or demonstrating behaviors of attempting to leave the unit.       Problem: Discharge Planning  Goal: Discharge to home or other facility with appropriate resources  4/14/2024 0936 by Lesia Howard RN  Outcome: Progressing  Flowsheets (Taken 4/14/2024 0739)  Discharge to home or other facility with appropriate resources: Identify barriers to discharge with patient and caregiver  Note: Patient voices no needs before discharge. Patient plans to be discharged to home with grandparents. Discharge planner working with patient to achieve optimal discharge plans, specific to individual needs.       Problem: Ida  Goal: Will exhibit normal sleep and speech and no impulsivity  Description: INTERVENTIONS:  1. Administer medication as ordered  2. Set limits on impulsive behavior  3. Make attempts to decrease external stimuli as possible  4/14/2024 0936 by Lesia Howard RN  Outcome: Progressing  Note: Patient reports sleeping well, normal speech, and able to manage impulses.     Problem: Psychosis  Goal: Will report no hallucinations or delusions  Description: INTERVENTIONS:  1. Administer medication as  ordered  2. Assist with reality testing to support increasing orientation  3. Assess if patient's hallucinations or delusions are encouraging self harm or harm to others and intervene as appropriate  4/14/2024 0936 by Lesia Howard RN  Outcome: Progressing  Note: Patient denies hallucinations or delusions so far this shift.       Problem: Anxiety  Goal: Will report anxiety at manageable levels  Description:

## 2024-04-14 NOTE — GROUP NOTE
Group Therapy Note    Date: 4/14/2024    Group Start Time: 0900  Group End Time: 0930  Group Topic: Community Meeting    [facility]    Renee Shultz        Group Therapy Note    Attendees: Patients Shared their goals for today.       Patient's Goal:  To suffer positively.    Notes:  Patient shared his goal & his eagerness to be discharged. Patient also shared his desire to attend EA groups.    Status After Intervention:  Improved    Participation Level: Active Listener and Interactive    Participation Quality: Appropriate, Attentive, and Sharing      Speech:  normal      Thought Process/Content: Linear      Affective Functioning: Congruent      Mood: euthymic      Level of consciousness:  Alert and Attentive      Response to Learning: Able to verbalize current knowledge/experience, Able to verbalize/acknowledge new learning, Capable of insight, and Progressing to goal      Endings: None Reported    Modes of Intervention: Education, Support, and Problem-solving      Discipline Responsible: Behavorial Health Tech      Signature:  Renee Shultz

## 2024-04-14 NOTE — PLAN OF CARE
Problem: Risk for Elopement  Goal: Patient will not exit the unit/facility without proper excort  Outcome: Progressing  Note: Patient has not been observed to be checking the exit doors or demonstrating behaviors of attempting to leave the unit.     Problem: Discharge Planning  Goal: Discharge to home or other facility with appropriate resources  Outcome: Progressing  Note: Patient plans to return home with grandparents and follow with Bartolome     Problem: Ida  Goal: Will exhibit normal sleep and speech and no impulsivity  Description: INTERVENTIONS:  1. Administer medication as ordered  2. Set limits on impulsive behavior  3. Make attempts to decrease external stimuli as possible  Outcome: Progressing  Note: Patient resting quietly and had no issues with controlling impulses     Problem: Psychosis  Goal: Will report no hallucinations or delusions  Description: INTERVENTIONS:  1. Administer medication as  ordered  2. Assist with reality testing to support increasing orientation  3. Assess if patient's hallucinations or delusions are encouraging self harm or harm to others and intervene as appropriate  Outcome: Progressing  Note: Patient denies all and is not seen interacting with internal stimuli     Problem: Anxiety  Goal: Will report anxiety at manageable levels  Description: INTERVENTIONS:  1. Administer medication as ordered  2. Teach and rehearse alternative coping skills  3. Provide emotional support with 1:1 interaction with staff  Outcome: Progressing  Note: Patient reports mood is 7/10 with low anxiety and depression     Problem: Drug Abuse/Detox  Goal: Will have no detox symptoms and will verbalize plan for changing drug-related behavior  Description: INTERVENTIONS:  1. Administer medication as ordered  2. Monitor physical status  3. Provide emotional support with 1:1 interaction with staff  4. Encourage  recovery focused treatment   Outcome: Progressing  Note: Patient denies any detox or withdrawal

## 2024-04-14 NOTE — BH NOTE
Group Therapy Note     Date: 4/14/2024  Start Time: 2000  End Time:  2020  Number of Participants: 1     Type of Group: Wrap-Up     Wellness Binder Information  Module Name:    Session Number:       Patient's Goal:  make appt with Staff Stuart     Notes:  Met     Status After Intervention:  Improved     Participation Level: Minimal     Participation Quality: Intrusive        Speech:  loud        Thought Process/Content: Linear        Affective Functioning: Exaggerated        Mood: anxious        Level of consciousness:  Oriented x4        Response to Learning: Able to change behavior        Endings: None Reported     Modes of Intervention: Support        Discipline Responsible: Registered Nurse        Signature:  Sobeida Rivera RN

## 2024-04-15 VITALS
TEMPERATURE: 97.7 F | RESPIRATION RATE: 18 BRPM | OXYGEN SATURATION: 98 % | SYSTOLIC BLOOD PRESSURE: 147 MMHG | DIASTOLIC BLOOD PRESSURE: 87 MMHG | HEART RATE: 103 BPM

## 2024-04-15 PROCEDURE — 6370000000 HC RX 637 (ALT 250 FOR IP): Performed by: PSYCHIATRY & NEUROLOGY

## 2024-04-15 RX ORDER — HYDROXYZINE 50 MG/1
50 TABLET, FILM COATED ORAL 3 TIMES DAILY PRN
Qty: 90 TABLET | Refills: 0 | Status: SHIPPED | OUTPATIENT
Start: 2024-04-15 | End: 2024-05-15

## 2024-04-15 RX ORDER — DIVALPROEX SODIUM 500 MG/1
1500 TABLET, EXTENDED RELEASE ORAL NIGHTLY
Qty: 90 TABLET | Refills: 0 | Status: SHIPPED | OUTPATIENT
Start: 2024-04-15

## 2024-04-15 RX ORDER — DOXEPIN HYDROCHLORIDE 50 MG/1
50 CAPSULE ORAL NIGHTLY PRN
Qty: 30 CAPSULE | Refills: 0 | Status: SHIPPED | OUTPATIENT
Start: 2024-04-15

## 2024-04-15 RX ORDER — BUSPIRONE HYDROCHLORIDE 10 MG/1
10 TABLET ORAL 2 TIMES DAILY
Qty: 60 TABLET | Refills: 0 | Status: SHIPPED | OUTPATIENT
Start: 2024-04-15

## 2024-04-15 RX ORDER — LURASIDONE HYDROCHLORIDE 80 MG/1
80 TABLET, FILM COATED ORAL
Qty: 30 TABLET | Refills: 0 | Status: SHIPPED | OUTPATIENT
Start: 2024-04-15

## 2024-04-15 RX ADMIN — BUSPIRONE HYDROCHLORIDE 10 MG: 10 TABLET ORAL at 07:47

## 2024-04-15 RX ADMIN — NICOTINE POLACRILEX 2 MG: 2 LOZENGE ORAL at 02:50

## 2024-04-15 RX ADMIN — NICOTINE POLACRILEX 2 MG: 2 LOZENGE ORAL at 09:26

## 2024-04-15 RX ADMIN — NICOTINE POLACRILEX 2 MG: 2 LOZENGE ORAL at 00:21

## 2024-04-15 RX ADMIN — NICOTINE POLACRILEX 2 MG: 2 LOZENGE ORAL at 07:27

## 2024-04-15 RX ADMIN — NICOTINE POLACRILEX 2 MG: 2 LOZENGE ORAL at 05:18

## 2024-04-15 RX ADMIN — HYDROXYZINE HYDROCHLORIDE 50 MG: 25 TABLET, FILM COATED ORAL at 04:06

## 2024-04-15 NOTE — DISCHARGE SUMMARY
Physician Discharge Summary     Patient ID:  Adalid Elmore  [id number]  29 y.o.  1995    Admit date: 4/8/2024    Discharge date and time: 4/15/2024  9:26 AM     Admitting Physician: Alverto Esquivel MD     Discharge Physician: Alverto Esquivel MD      Admission Diagnoses: Bipolar 1 disorder (HCC) [F31.9]    IDENTIFYING INFORMATION: ***    HISTORY OF PRESENT ILLNESS: ***    MENTAL STATUS EXAMINATION AT ADMISSION: See H and P.    Discharge Diagnoses:   Bipolar I disorder, current or most recent episode manic, severe (HCC)     History reviewed. No pertinent past medical history.     Admission Condition: poor    Discharged Condition: stable    Indication for Admission: threat to self    Significant Diagnostic Studies:   See Results Review tab in EHR      Depakote level:   Recent Labs     04/14/24  0740   VALPROATE 52.0       TREATMENT AND CLINICAL COURSE:   Patient was admitted on the unit. Routine lab was ordered. Physical examination was within normal limits. At admission, patient was started on ***; Hydroxyzine & Trazodone were added. Patient did not have side effect from medications. Patient was involved in group and milieu therapy. Although patient was suicidal upon admission, patient did not have suicidal thought during this hospital stay. I strongly encouraged patient's sobriety from illicit drugs and alcohol.  I spent some time discussing the effect of illicit drugs & alcohol on patient's mood. We discussed about smoking cessation. Toward the end of the hospital stay, patient become more hopeful. Overall, hospital stay was uncomplicated, and patient was discharged in stable condition.    Consults: none    Treatments: Psychotropic medications, therapy with group, milieu, and 1:1 with nurses, social workers and Attending physician.      Discharge Medications:  Current Discharge Medication List        START taking these medications    Details   hydrOXYzine HCl (ATARAX) 50 MG tablet Take 1 tablet by mouth 3 times

## 2024-04-15 NOTE — PROGRESS NOTES
Group Therapy Note    Date: 4/10/2024  Start Time: 1400  End Time:  1430  Number of Participants: 5    Type of Group: Psychotherapy    Notes:  Patient was present in group. Group members discussed the topic of identity. Members spoke about how they define themselves versus how others would define them. Members discussed the importance of knowing who they are. Patient had rapid speech at times. Patient at times would attempt to discuss marijuana and alcohol use, but was able to be redirected.     Status After Intervention:  Unchanged    Participation Level: Interactive    Participation Quality: Inappropriate, Intrusive, and Resistant      Speech:  pressured      Thought Process/Content: Flight of ideas      Affective Functioning: Congruent      Mood: elevated and irritable      Level of consciousness:  Alert and Attentive      Response to Learning: Able to verbalize current knowledge/experience, Able to retain information, Capable of insight, and Able to change behavior      Endings: None Reported    Modes of Intervention: Education, Support, Socialization, Exploration, Clarifying, and Problem-solving      Discipline Responsible: /Counselor      Signature:  RODDY Terry  
                                                                    Group Therapy Note    Date: 4/12/2024      Patient attended group. Patient did not voice a goal, will talk with patient again and encourage the importance of goal setting.       Signature:  Angelina Armando LPN    
                                                                   Group Therapy Note    Date: 4/12/2024  Start Time: 1330   End Time:  1400  Number of Participants: 5    Type of Group: Psychotherapy      Notes:  Pt is present for group. Pt offers some input. He tends to be somewhat more relevant than other group memebrs. The group discussion initially began by examining how family beliefs and or genetics have influenced each group members life today. The group was asked to explore control vs lack of control and what steps they could take to improve their life for today. Group however had to be ended early due to the acuity of some of the group members who were very monopolizing, intrusive with prominent delusions.     Status After Intervention:  Unchanged    Participation Level: Active Listener and Interactive    Participation Quality: Appropriate, Attentive, and Sharing      Speech:  normal      Thought Process/Content: Linear      Affective Functioning: Congruent      Mood: euthymic      Level of consciousness:  Alert, Oriented x4, and Attentive      Response to Learning: Able to verbalize current knowledge/experience, Able to verbalize/acknowledge new learning, Able to retain information, Capable of insight, Able to change behavior, and Progressing to goal      Endings: None Reported    Modes of Intervention: Education, Support, Socialization, Exploration, Clarifying, and Problem-solving      Discipline Responsible: /Counselor      Signature:  RODDY Tom    
                                                           Psychosocial Assessment    Current Level of Psychosocial Functioning     Independent   Dependent        XXX  Minimal Assist     Comments:      Psychosocial High Risk Factors (check all that apply)    Unable to obtain meds   Chronic illness/pain    Substance abuse   Lack of Family Support   Financial stress   Isolation   Inadequate Community Resources  Suicide attempt(s)  Not taking medications      XXX  Victim of crime   Developmental Delay  Unable to manage personal needs    Age 65 or older   Homeless  No transportation   Readmission within 30 days  Unemployment  Traumatic Event    Family/Supports identified:     Family    Sexual Orientation:        Heterosexual    Patient Strengths:      Support, stable housing, connected with treatment. Has some  insight and appears intelligent.     Patient Barriers:       Non compliance    Safety plan:       Contracts for safety    CMHC/ history:      XXXX    Plan of Care:  medication management, group/individual therapies, family meetings, psycho -education, treatment team meetings to assist with stabilization    Initial Discharge Plan:  [facility]    Clinical Summary:  Adalid is a 29 year old male who is a direct admission from [facility] on an [facility]. He has a significant history of bipolar. He is pleasant and willing to converse, however he is exteremly;y manic with rapid, pressured speech and very elevated mood. He does identify his tiny and acknowledged his need to resume his medication which he stopped taking. He is originally born and raise in [facility]. December of 2023, he moved to [place] where he resides with family. He reports onset of anxiety at about the age of 1. He talked about his experience when his mother would drop he and his brother off at the day care center. He was treated in [place] by a psychiatrist for his bipolar disorder. He is presently connected with Dr. Esquivel at 
                Goal Wrap-Up/Relaxation Group    Date: 4/11/2024  Start Time: 2000  End Time:  2020    Type of Group: Goal Wrap Up    States that goal today was: \"Work on getting out of here and get through the day\"    Goal for today was Still working on it     Patient Participated in group/activities appropriately      Signature: Marquis Basurto RN  
            Patient did not attend group this evening.   
  Group Therapy Note    Date: 4/14/2024  Start Time: 1330  End Time:  1445  Number of Participants: 6    Type of Group: Psychotherapy      Notes:  Pt is present for group. Peers discussed feelings of anxiety and stress. Group members explored life events which are connected with feelings of anxiety and stress. Group members were guided through a relaxation activity and then identified the benefit of proactive stress management rather than reactive stress management. Group members discussed how this can help to promote the emotional recovery process.     Status After Intervention:  Improved    Participation Level: Active Listener and Interactive    Participation Quality: Appropriate, Attentive, Sharing, and Supportive      Speech:  normal      Thought Process/Content: Logical  Linear      Affective Functioning: Congruent      Mood: euthymic      Level of consciousness:  Alert, Oriented x4, and Attentive      Response to Learning: Able to verbalize current knowledge/experience, Able to verbalize/acknowledge new learning, Able to retain information, Capable of insight, Able to change behavior, and Progressing to goal      Endings: None Reported    Modes of Intervention: Education, Support, Socialization, Exploration, Clarifying, Problem-solving, and Activity      Discipline Responsible: /Counselor      Signature:  RODDY Tom  
24 hour chart review complete.  
24 hour chart review completed.  
Behavioral Health   Admission Note   Admission Type: Involuntary    Reason for Admission: \"Because Dr. Esquivel says so.\"    Patient Strengths/Barriers  Strengths (Must Choose Two): Stable housing, Support from family  Barriers: Support of organized community, Independent living    Addictive Behavior  In the Past 3 Months, Have You Felt or Has Someone Told You That You Have a Problem With  : Other (comment) (unable to assess at this time)    Medical Problems:   No past medical history on file.    Status EXAM:  Mental Status and Behavioral Exam  Normal: No  Level of Assistance: Independent/Self  Facial Expression: Elevated, Worried  Affect: Unstable  Level of Consciousness: Alert  Frequency of Checks: 4 times per hour, close  Mood:Normal: No  Mood: Labile, Anxious, Helpless  Motor Activity:Normal: No  Motor Activity: Increased, Repetitive acts  Eye Contact: Good  Observed Behavior: Impulsive, Cooperative, Hypermobile  Sexual Misconduct History: Current - no  Preception: [place] to person, [place] to time, [place] to place, [place] to situation  Attention:Normal: No  Attention: Hyperalert  Thought Processes: Loose association, Tangential, Flight of ideas  Thought Content:Normal: No  Thought Content: Paranoia  Depression Symptoms: No problems reported or observed.  Anxiety Symptoms: Generalized  Ida Symptoms: Flight of ideas, Increased energy, Increased spending, Labile, Less need to sleep, Poor judgment, Pressured speech, Psychomotor agitation, Rapid cycling  Hallucinations: Unable to assess  Delusions: Yes  Delusions: Paranoid  Memory:Normal: No  Memory: Confabulation  Insight and Judgment: No  Insight and Judgment: Poor judgment, Poor insight, Unrealistic    Pt admitted with followings belongings:  Dental Appliances: None  Vision - Corrective Lenses: Eyeglasses  Hearing Aid: None  Jewelry: None  Body Piercings Removed: N/A  Clothing: Footwear, Pants, Socks, Undergarments, Dress, Jacket/Coat  Other Valuables: Wallet, 
Behavioral Health  Day 7/Weekly Interdisciplinary Treatment Plan NOTE    Patient was in treatment team    ADMISSION TYPE:   Admission Type: Involuntary    REASON FOR ADMISSION:  Reason for Admission: \"Because Dr. Esquivel says so.\"  Estimated Length of Stay Update:  04/21/24  Estimated Discharge Date Update: 1-2 days    Patient Strengths/Barriers  Strengths (Must Choose Two): Stable housing, Support from family  Barriers: Support of organized community, Independent living  Addictive Behavior:Addictive Behavior  In the Past 3 Months, Have You Felt or Has Someone Told You That You Have a Problem With  : Other (comment) (unable to assess at this time)  Medical Problems:History reviewed. No pertinent past medical history.    RISK:  Fall Risk   Faustino Scale Faustino Scale Score: 22    STATUS EXAM:   Mental Status and Behavioral Exam  Normal: No  Level of Assistance: Independent/Self  Facial Expression: Brightened  Affect: Appropriate  Level of Consciousness: Alert  Frequency of Checks: 4 times per hour, close  Mood:Normal: No  Mood: Anxious  Motor Activity:Normal: Yes  Motor Activity: Other (comment) (na)  Eye Contact: Good  Observed Behavior: Cooperative  Sexual Misconduct History: Current - no  Preception: [place] to person, [place] to time, [place] to place  Attention:Normal: Yes  Attention: Others (comment)  Thought Processes: Unremarkable  Thought Content:Normal: Yes  Thought Content: Other (comment)  Depression Symptoms: No problems reported or observed.  Anxiety Symptoms: Generalized  Ida Symptoms: No problems reported or observed.  Hallucinations: None  Delusions: No  Delusions: Other (comment)  Memory:Normal: No  Memory: Poor recent  Insight and Judgment: No  Insight and Judgment: Poor judgment    DAILY ASSESSMENT LAST ENTRY:   Daily Sleep (WDL): Exceptions to WDL            Daily Nutrition (WDL): Within Defined Limits  Level of Assistance: Independent/Self    PATIENT MONITORING:  Frequency of Checks: 4 times per 
Behavioral Health  Initial Interdisciplinary Treatment Plan NOTE    REVIEW DATE AND TIME: 04/08/12  1529    PATIENT was IN TREATMENT TEAM.  See Multidisciplinary Treatment Team sheet for participants.    ADMISSION TYPE:   Admission Type: Involuntary    REASON FOR ADMISSION:  Reason for Admission: \"Because Dr. Esquivel says so.\"      Estimated Length of Stay Update:  04/10/24  Estimated Discharge Date Update: 3-5 days    Patient Strengths/Barriers  Strengths (Must Choose Two): Stable housing, Support from family  Barriers: Support of organized community, Independent living  Addictive Behavior:Addictive Behavior  In the Past 3 Months, Have You Felt or Has Someone Told You That You Have a Problem With  : Other (comment) (unable to assess at this time)  Medical Problems:No past medical history on file.    EDUCATION:   Learner Progress Toward Treatment Goals: Reviewed results and recommendations of this team, Reviewed group plan and strategies, Reviewed signs, symptoms and risk of self harm and violent behavior, and Reviewed goals and plan of care    Method: Individual    Outcome: Verbalized understanding and Needs reinforcement  pt is very manic at the time of assessment.     PATIENT GOALS: To get back on my medication    OQ PRIORITIES IDENTIFIED BY THE PATIENT ON ADMISSION: (These are the symptoms that the patient has identified that are impacting them the most at the time of admission based on their own input on the OQ.  These priorities are to be included in all of their care while admitted.)        ND    PLAN/TREATMENT RECOMMENDATIONS UPDATE:   What is the most important thing we can help you with while you are here? See above  Who is your support system? Family  Do you have follow-up providers? [facility] in [place]  Do you have the ability to pay for your medications? Yes  Where will you be residing when you leave the hospital? [place]  Will need a return to work slip or FMLA paper completion? None      GOALS 
Case management 1535-Telephoned [facility]. Requested a return telephone call for the purpose of discharge planning. I shared that pt is a possible discharge this coming Monday.   
Daily Progress Note  Alverto Esquivel MD  4/11/2024    Reviewed patient's current plan of care and vital signs with nursing staff.  Sleep:  5.5 hours last night, broken  Attending groups: No  No reported Suicidal thought. Good interaction with peers & staff.  He is irritable at times asking to go home.  Mood 4 on a scale of 1 to 10 with 10 is feeling normal.  Patient's symptoms are improving some but he is not ready for discharge yet.  His speech is still pressured.    SUBJECTIVE:    Patient is feeling better. SUICIDAL IDEATION denies suicidal ideation.  Patient does not have medication side effects.    ROS: Patient has new complaints:  No  Sleeping adequately:  Yes  Visitors: No    Mental Status Examination:  Patient is cooperative. Speech: Normal rate and tone, Loud, and Pressured.  No abnormal movements, tics or mannerisms.  Mood irritable; affect labile affect. Suicidal ideation Absent.  Homicidal ideations Absent.   Hallucinations Absent.  Delusions Absent. Thought Content: Tangential. Thought Processes: Flight of ideas. Alert and oriented X 3.  Attention and concentration Fair. MEMORY intact.  Insight and Judgement impaired insight.      Data   oral temperature is 97.8 °F (36.6 °C). His blood pressure is 129/89 and his pulse is 82. His respiration is 17 and oxygen saturation is 99%.   Labs:            Medications  Current Facility-Administered Medications: lurasidone (LATUDA) tablet 80 mg, 80 mg, Oral, Lunch  acetaminophen (TYLENOL) tablet 650 mg, 650 mg, Oral, Q4H PRN  ibuprofen (ADVIL;MOTRIN) tablet 400 mg, 400 mg, Oral, Q6H PRN  polyethylene glycol (GLYCOLAX) packet 17 g, 17 g, Oral, Daily PRN  divalproex (DEPAKOTE ER) extended release tablet 1,500 mg, 1,500 mg, Oral, Nightly  ziprasidone (GEODON) 10 mg in sterile water 0.5 mL injection, 10 mg, IntraMUSCular, TID PRN  hydrOXYzine HCl (ATARAX) tablet 50 mg, 50 mg, Oral, TID PRN  traZODone (DESYREL) tablet 50 mg, 50 mg, Oral, Nightly PRN  aluminum & magnesium 
Daily Progress Note  Alverto Esquivel MD  4/12/2024    Reviewed patient's current plan of care and vital signs with nursing staff.  Sleep:  4.5 hours last night, broken  Attending groups: Yes  No reported Suicidal thought. Fair interaction with peers & staff.  He is less irritable and less intrusive.  But he is still not sleeping even by taking trazodone.  Mood 3-4 on a scale of 1 to 10 with 10 is feeling normal.  He feels that his mood is down since he is stuck on this unit    SUBJECTIVE:    Patient is feeling better. SUICIDAL IDEATION denies suicidal ideation.  Patient does not have medication side effects.    ROS: Patient has new complaints:  No  Sleeping adequately: No  Visitors: No    Mental Status Examination:  Patient is cooperative. Speech: Normal rate and tone, less pressured.  No abnormal movements, tics or mannerisms.  Mood less irritable; affect restricted. Suicidal ideation Absent.  Homicidal ideations Absent.   Hallucinations Absent.  Delusions Absent. Thought Content: Tangential. Thought Processes: Less flight of ideas. Alert and oriented X 3.  Attention and concentration Fair. MEMORY intact.  Insight and Judgement impaired insight.      Data   oral temperature is 98.1 °F (36.7 °C). His blood pressure is 119/80 and his pulse is 90. His respiration is 14 and oxygen saturation is 98%.   Labs:            Medications  Current Facility-Administered Medications: lurasidone (LATUDA) tablet 80 mg, 80 mg, Oral, Lunch  acetaminophen (TYLENOL) tablet 650 mg, 650 mg, Oral, Q4H PRN  ibuprofen (ADVIL;MOTRIN) tablet 400 mg, 400 mg, Oral, Q6H PRN  polyethylene glycol (GLYCOLAX) packet 17 g, 17 g, Oral, Daily PRN  divalproex (DEPAKOTE ER) extended release tablet 1,500 mg, 1,500 mg, Oral, Nightly  ziprasidone (GEODON) 10 mg in sterile water 0.5 mL injection, 10 mg, IntraMUSCular, TID PRN  hydrOXYzine HCl (ATARAX) tablet 50 mg, 50 mg, Oral, TID PRN  traZODone (DESYREL) tablet 50 mg, 50 mg, Oral, Nightly PRN  aluminum & 
Daily Progress Note  Alverto Esquivel MD  4/14/2024    Reviewed patient's current plan of care and vital signs with nursing staff.  Sleep:  8 hours last night, broken  Attending groups: Yes  No reported Suicidal thought. Good interaction with peers & staff, not intrusive.  No agitation or behavioral issues.  He was able to sleep last night with doxepin 50 mg.  He is hopeful for the future.    SUBJECTIVE:    Patient is feeling better. SUICIDAL IDEATION denies suicidal ideation.  Patient does not have medication side effects.    ROS: Patient has new complaints:  No  Sleeping adequately: No  Visitors: No    Mental Status Examination:  Patient is cooperative. Speech: Normal rate and tone, less pressured.  No abnormal movements, tics or mannerisms.  Mood less irritable; affect restricted. Suicidal ideation Absent.  Homicidal ideations Absent.   Hallucinations Absent.  Delusions Absent. Thought Content: Tangential. Thought Processes: Less flight of ideas. Alert and oriented X 3.  Attention and concentration Fair. MEMORY intact.  Insight and Judgement impaired insight.      Data   oral temperature is 98.6 °F (37 °C). His blood pressure is 119/84 and his pulse is 89. His respiration is 16 and oxygen saturation is 97%.   Labs:    Latest Reference Range & Units 04/14/24 07:40   Valproic Acid Lvl 50.0 - 100.0 ug/mL 52.0       Medications  Current Facility-Administered Medications: doxepin (SINEQUAN) capsule 50 mg, 50 mg, Oral, Nightly  doxepin (SINEQUAN) capsule 50 mg, 50 mg, Oral, Nightly PRN  traZODone (DESYREL) tablet 100 mg, 100 mg, Oral, Nightly PRN  lurasidone (LATUDA) tablet 80 mg, 80 mg, Oral, Lunch  acetaminophen (TYLENOL) tablet 650 mg, 650 mg, Oral, Q4H PRN  ibuprofen (ADVIL;MOTRIN) tablet 400 mg, 400 mg, Oral, Q6H PRN  polyethylene glycol (GLYCOLAX) packet 17 g, 17 g, Oral, Daily PRN  divalproex (DEPAKOTE ER) extended release tablet 1,500 mg, 1,500 mg, Oral, Nightly  ziprasidone (GEODON) 10 mg in sterile water 0.5 mL 
Daily Progress Note  Alverto Esquivel MD  4/9/2024    Reviewed patient's current plan of care and vital signs with nursing staff.  Sleep:  6.5 hours last night  Attending groups: No  No reported Suicidal thought. Good interaction with peers & staff but he is very intrusive.  He is loud and irritable; no agitation, his room is a mess.  Mood 7 on a scale of 1 to 10 with 10 is feeling normal.     SUBJECTIVE:    Patient is feeling unchanged. SUICIDAL IDEATION denies suicidal ideation.  Patient does not have medication side effects.    ROS: Patient has new complaints:  No  Sleeping adequately:  Yes  Visitors: No    Mental Status Examination:  Patient is cooperative. Speech: Normal rate and tone, Loud, and Pressured.  No abnormal movements, tics or mannerisms.  Mood irritable; affect labile affect. Suicidal ideation Absent.  Homicidal ideations Absent.   Hallucinations Absent.  Delusions Absent. Thought Content: normal. Thought Processes: Goal-Directed. Alert and oriented X 3.  Attention and concentration Fair. MEMORY intact.  Insight and Judgement impaired insight.      Data   oral temperature is 98.2 °F (36.8 °C). His blood pressure is 141/84 (abnormal) and his pulse is 98. His respiration is 18 and oxygen saturation is 98%.   Labs:   Admission on 04/08/2024   Component Date Value Ref Range Status    TSH 04/08/2024 2.620  0.400 - 4.200 uIU/mL Final    Performed at [facility] [address] [place] [id number]    T4 Free 04/08/2024 1.20  0.93 - 1.68 ng/dL Final    Performed at [facility] [address] [place] [id number]    WBC 04/09/2024 6.3  4.8 - 10.8 thou/mm3 Final    RBC 04/09/2024 4.81  4.70 - 6.10 mill/mm3 Final    Hemoglobin 04/09/2024 15.1  14.0 - 18.0 gm/dl Final    Hematocrit 04/09/2024 44.5  42.0 - 52.0 % Final    MCV 04/09/2024 92.5  80.0 - 94.0 fL Final    MCH 04/09/2024 31.4  26.0 - 33.0 pg Final    MCHC 04/09/2024 33.9  32.2 - 35.5 gm/dl Final    RDW-CV 04/09/2024 13.3  11.5 - 14.5 % Final 
Discharge planning-Adalid is scheduled for a follow up appointment with Dr. Esquivel on 04/30/24 at 1:40 pm. [facility].   
INPATIENT RECREATIONAL THERAPY  ADULT BEHAVIORAL SERVICES  ASSESSMENT    REFERRING PHYSICIAN: Dr. Alverto Esquivel  DIAGNOSIS:   bipolar I disorder, current or most recent episode manic  PRECAUTIONS:  na  HISTORY OF PRESENT ILLNESS/INJURY:   PMH:  Please see medical chart for prior medical history, allergies, and medication.    HISTORY OF PSYCHIATRIC TREATMENT: unknown  YOB: 1995  GENDER:  male  MARITAL STATUS:  single  EMPLOYMENT STATUS:  [employer]  LIVING SITUATION:  with grandparents  IDENTIFIED SUPPORT SYSTEM:  grandparents, friends  EDUCATIONAL LEVEL: unknown  MEDICATION/DRUG USE: unknown      COGNITION: WFl  ATTENTION: WFL  RELAXATION: appropriate  SELF-ESTEEM:  appropriate  MOTIVATION:  hopeful to return home    SOCIAL SKILLS:  appropriate  FRUSTRATION TOLERANCE:  engages with this writer appropriately  ATTENTION SEEKING: no  COOPERATION: appropriate  AFFECT: appropriate  APPEARANCE: appropriate    HEARING:  WFL  VISION:  WFL   VERBAL COMMUNICATION:  WFL  WRITTEN COMMUNICATION:  WFL    COORDINATION: WFL   MOBILITY: WFL    GOALS:  return home             
Patient participated in nursing group with [employer] students. Participated well per student report.   
Psychotherapy group 1330- Patient did not attend.   
Pt did not attend goal wrap up/relaxation group with the [employer] nursing students.   
Spiritual Support Group Note    Number of Participants in Group: 4                               Time: 1415 1500    Goal: The spirituality group focused on teaching boundaries in relation to time. The goal is to establish a day every week to sabbath. For sabbathing the goal is to reflect on self/family/divine and establish a time every week that is holy (set aside) for rest and reflection.    Topic:  [x] Spiritual Wellness and Self Care                  [] Hope                     [x] Connecting with Divine/Others        [] Thankfulness and Gratitude               []  Meaningfulness and Purpose               [] Forgiveness               [] Peace               [x] Connect to Community     [] Other:    Participation Level:   [x] Active Listener   [] Minimal   [x] Monopolizing   [x] Interactive   [] No Participation   []  Other:     Attention:   [x] Alert   [x] Distractible   [] Drowsy   [] Poor   [] Other:    Manner:   [x] Cooperative   [] Suspicious   [] Withdrawn   [] Guarded   [] Irritable   [] Inhospitable   [x] Other:Distracting     Others Comments from Group: The patient was active in the spiritual care group. The patient shared his passion for drugs and his own self destructive urges. One example of this behavior he shared is a desire to send cigarettes over state lines. His reasoning was he wanted to get in trouble with the feds. He also shared his desire to get the 500 dollar fine for this action. The patient also shared his passion for drug use and shared daily use. The patient did attempt to build a friendships in group.      04/11/24 1520   Encounter Summary   Encounter Overview/Reason  Behavioral Health   Service Provided For: Patient   Last Encounter  04/11/24   Complexity of Encounter High   Begin Time 1415   End Time  1500   Total Time Calculated 45 min   Behavioral Health    Type  Sp[facility]       
(DEPAKOTE ER) extended release tablet 1,500 mg, 1,500 mg, Oral, Nightly  ziprasidone (GEODON) 10 mg in sterile water 0.5 mL injection, 10 mg, IntraMUSCular, TID PRN  hydrOXYzine HCl (ATARAX) tablet 50 mg, 50 mg, Oral, TID PRN  aluminum & magnesium hydroxide-simethicone (MAALOX) 200-200-20 MG/5ML suspension 30 mL, 30 mL, Oral, Q6H PRN  nicotine polacrilex (COMMIT) lozenge 2 mg, 2 mg, Oral, Q2H PRN  busPIRone (BUSPAR) tablet 10 mg, 10 mg, Oral, BID    ASSESSMENT  Bipolar I disorder, current or most recent episode manic, severe (HCC)     PLAN  Patient's symptoms are improving  Continue with current medications.  Attempt to develop insight  Psycho-education conducted.  Supportive Therapy conducted.  Probable discharge is today  Follow-up @ [facility].      
200-200-20 MG/5ML suspension 30 mL, 30 mL, Oral, Q6H PRN  nicotine polacrilex (COMMIT) lozenge 2 mg, 2 mg, Oral, Q2H PRN  busPIRone (BUSPAR) tablet 10 mg, 10 mg, Oral, BID  lurasidone (LATUDA) tablet 60 mg, 60 mg, Oral, Lunch    ASSESSMENT  Bipolar I disorder, current or most recent episode manic, severe (HCC)     PLAN  Patient's symptoms show no change  Continue with current medications, increase lurasidone.  Attempt to develop insight  Psycho-education conducted.  Supportive Therapy conducted.    
ER) extended release tablet 1,500 mg, 1,500 mg, Oral, Nightly  ziprasidone (GEODON) 10 mg in sterile water 0.5 mL injection, 10 mg, IntraMUSCular, TID PRN  hydrOXYzine HCl (ATARAX) tablet 50 mg, 50 mg, Oral, TID PRN  aluminum & magnesium hydroxide-simethicone (MAALOX) 200-200-20 MG/5ML suspension 30 mL, 30 mL, Oral, Q6H PRN  nicotine polacrilex (COMMIT) lozenge 2 mg, 2 mg, Oral, Q2H PRN  busPIRone (BUSPAR) tablet 10 mg, 10 mg, Oral, BID    ASSESSMENT  Bipolar I disorder, current or most recent episode manic, severe (HCC)     PLAN  Patient's symptoms are improving  Continue with current medications, discontinue trazodone, resume and increase doxepin.  Valproic acid level tomorrow.  Attempt to develop insight  Psycho-education conducted.  Supportive Therapy conducted.    Greater than 50% of unit time spent providing counseling &/or coordination of care.     
reported or observed.  Anxiety Symptoms: Generalized  Ida Symptoms: No problems reported or observed.  Hallucinations: None  Delusions: No  Delusions:  (none observed)  Memory:Normal: No  Memory: Poor recent  Insight and Judgment: No  Insight and Judgment: Poor insight, Poor judgment    Linda Saravia RN

## 2024-04-15 NOTE — GROUP NOTE
Group Therapy Note    Date: 4/15/2024    Group Start Time: 1000  Group End Time: 1045  Group Topic: Healthy Living/Wellness    [facility] 4[facility]    Rosalia Smith LPN        Group Therapy Note    Attendees: 6       Notes:  attended    Status After Intervention:  Improved    Participation Level: Active Listener and Interactive    Participation Quality: Appropriate, Attentive, and Sharing      Speech:  normal      Thought Process/Content: Logical      Affective Functioning: Flat      Level of consciousness:  Alert, Oriented x4, and Attentive      Response to Learning: Able to verbalize current knowledge/experience, Able to verbalize/acknowledge new learning, Able to retain information, and Capable of insight      Endings: None Reported    Modes of Intervention: Education and Socialization      Discipline Responsible: nursing students      Signature:  Rosalia Smith LPN

## 2024-04-15 NOTE — BH NOTE
Group Therapy Note    Date: 4/14/2024  Start Time: 2000  End Time:  2020  Number of Participants: 1    Type of Group: Wrap-Up    Wellness Binder Information  Module Name:    Session Number:      Patient's Goal:  suffer positively    Notes:  working on goal    Status After Intervention:  Improved    Participation Level: Monopolizing    Participation Quality: Intrusive      Speech:  pressured      Thought Process/Content: Linear      Affective Functioning: Exaggerated      Mood: anxious      Level of consciousness:  Oriented x4      Response to Learning: Able to verbalize/acknowledge new learning      Endings: None Reported    Modes of Intervention: Education      Discipline Responsible: Registered Nurse      Signature:  Sobeida Rivera RN

## 2024-04-15 NOTE — PLAN OF CARE
Problem: Risk for Elopement  Goal: Patient will not exit the unit/facility without proper excort  4/15/2024 0916 by Linda Saravia RN  Outcome: Completed  Flowsheets (Taken 4/15/2024 0906)  Nursing Interventions for Elopement Risk: Assist with personal care needs such as toileting, eating, dressing, as needed to reduce the risk of wandering  4/14/2024 2143 by Sobeida Rivera, RN  Outcome: Progressing  Note: Patient has not been observed to be checking the exit doors or demonstrating behaviors of attempting to leave the unit.     Problem: Discharge Planning  Goal: Discharge to home or other facility with appropriate resources  4/15/2024 0916 by Linda Saravia RN  Outcome: Completed  Flowsheets (Taken 4/15/2024 0906)  Discharge to home or other facility with appropriate resources: Identify barriers to discharge with patient and caregiver  4/14/2024 2143 by Sobeida Rivera, RN  Outcome: Progressing  Note: Patient to be discharged home with grandparents and follow with [place]     Problem: Ida  Goal: Will exhibit normal sleep and speech and no impulsivity  Description: INTERVENTIONS:  1. Administer medication as ordered  2. Set limits on impulsive behavior  3. Make attempts to decrease external stimuli as possible  4/15/2024 0916 by Linda Saravia RN  Outcome: Completed  4/14/2024 2143 by Sobeida Rivera, RN  Outcome: Progressing  Note: Patient resting quietly and had no issues with controlling impulses     Problem: Psychosis  Goal: Will report no hallucinations or delusions  Description: INTERVENTIONS:  1. Administer medication as  ordered  2. Assist with reality testing to support increasing orientation  3. Assess if patient's hallucinations or delusions are encouraging self harm or harm to others and intervene as appropriate  4/15/2024 0916 by Linda Saravia RN  Outcome: Completed  4/14/2024 2143 by Sobeida Rivera, RN  Outcome: Progressing  Note: Patient denies all and is not seen interacting with  Ok to change to zoloft 50mg po daily #30 with 1 refill; call back if 4 weeks for update  ; any suicidal thoughts,. Go to Er .

## 2024-04-15 NOTE — PLAN OF CARE
Problem: Risk for Elopement  Goal: Patient will not exit the unit/facility without proper excort  4/14/2024 2143 by Sobeida Rivera RN  Outcome: Progressing  Note: Patient has not been observed to be checking the exit doors or demonstrating behaviors of attempting to leave the unit.  4/14/2024 0936 by Lesia Howard RN  Outcome: Progressing  Flowsheets (Taken 4/14/2024 0739)  Nursing Interventions for Elopement Risk: Assist with personal care needs such as toileting, eating, dressing, as needed to reduce the risk of wandering  Note: Patient has not been observed to be checking the exit doors or demonstrating behaviors of attempting to leave the unit.       Problem: Discharge Planning  Goal: Discharge to home or other facility with appropriate resources  4/14/2024 2143 by Sobeida Rivera RN  Outcome: Progressing  Note: Patient to be discharged home with grandparents and follow with [facility]  4/14/2024 0936 by Lesia Howard RN  Outcome: Progressing  Flowsheets (Taken 4/14/2024 0739)  Discharge to home or other facility with appropriate resources: Identify barriers to discharge with patient and caregiver  Note: Patient voices no needs before discharge. Patient plans to be discharged to home with grandparents. Discharge planner working with patient to achieve optimal discharge plans, specific to individual needs.       Problem: Ida  Goal: Will exhibit normal sleep and speech and no impulsivity  Description: INTERVENTIONS:  1. Administer medication as ordered  2. Set limits on impulsive behavior  3. Make attempts to decrease external stimuli as possible  4/14/2024 2143 by Sobeida Rivera RN  Outcome: Progressing  Note: Patient resting quietly and had no issues with controlling impulses  4/14/2024 0936 by Leisa Howard RN  Outcome: Progressing  Note: Patient reports sleeping well, normal speech, and able to manage impulses.     Problem: Psychosis  Goal: Will report no hallucinations or

## 2024-04-15 NOTE — DISCHARGE INSTRUCTIONS
Discharge: 04/15/24    Attending Provider: [unfilled]   Discharging Provider: Alvin  To contact this individual call *** and ask the  to page.  If unavailable, ask to be transferred to Behavioral Health Provider on call.  A Behavioral Health Provider will be available on call 24/7 and during holidays.    Primary Care Provider: @PCP@    [unfilled]    Reason for Admission: bipolar    Admission Diagnosis: Bipolar 1 disorder (HCC) [F31.9]    * No surgery found *    [unfilled]    Immunizations administered during this encounter: @IMM@  None of the above/Not documented/Unable to determine from medical record documentation    Screening for Metabolic Disorders for Patients on Antipsychotic Medications  (Data obtained from the EMR)    Estimated Body Mass Index  @BMI@      Vital Signs/Blood Pressure  @VS@     Fasting Blood Glucose or Hemoglobin A1c  @BRIEFLAB(GLU,GLPOC,GLUCPOC)@    @RESULAST(LABA1C,BLY6INTO)@    Discharge Diagnosis: bipolar    Discharge Plan/Destination: home      Discharge Medication List and Instructions: {Medication reconciliation information is now added to the patient's AVS automatically when it is printed.  There is no need to use this SmartLink in discharge instructions.  Highlight this text and delete it to clear this message}      Unresulted Labs (24h ago, onward)      None            To obtain results of studies pending at discharge, please contact ***    [unfilled]     Advanced Directive:   Does the patient have an appointed surrogate decision maker? No  Does the patient have a Medical Advance Directive? No  Does the patient have a Psychiatric Advance Directive? No  If the patient does not have a surrogate or Medical Advance Directive AND Psychiatric Advance Directive, the patient was offered information on these advance directives {Yes/Declined:[id number]}    Patient Instructions: Please continue all medications until otherwise directed by physician.  Yes      Tobacco Cessation

## 2024-05-03 ENCOUNTER — HOSPITAL ENCOUNTER (INPATIENT)
Age: 29
LOS: 5 days | Discharge: HOME | DRG: 753 | End: 2024-05-08
Attending: PSYCHIATRY & NEUROLOGY | Admitting: PSYCHIATRY & NEUROLOGY
Payer: COMMERCIAL

## 2024-05-03 PROBLEM — F31.12 BIPOLAR 1 DISORDER, MANIC, MODERATE (HCC): Status: ACTIVE | Noted: 2024-05-03

## 2024-05-03 LAB
T4 FREE SERPL-MCNC: 1.07 NG/DL (ref 0.93–1.68)
TSH SERPL DL<=0.005 MIU/L-ACNC: 0.86 UIU/ML (ref 0.4–4.2)
VALPROATE SERPL-MCNC: 22.7 UG/ML (ref 50–100)

## 2024-05-03 PROCEDURE — 80164 ASSAY DIPROPYLACETIC ACD TOT: CPT

## 2024-05-03 PROCEDURE — 6370000000 HC RX 637 (ALT 250 FOR IP): Performed by: PSYCHIATRY & NEUROLOGY

## 2024-05-03 PROCEDURE — 36415 COLL VENOUS BLD VENIPUNCTURE: CPT

## 2024-05-03 PROCEDURE — 1240000000 HC EMOTIONAL WELLNESS R&B

## 2024-05-03 PROCEDURE — 84439 ASSAY OF FREE THYROXINE: CPT

## 2024-05-03 PROCEDURE — 84443 ASSAY THYROID STIM HORMONE: CPT

## 2024-05-03 RX ORDER — BUSPIRONE HYDROCHLORIDE 10 MG/1
10 TABLET ORAL 2 TIMES DAILY
Status: DISCONTINUED | OUTPATIENT
Start: 2024-05-03 | End: 2024-05-08 | Stop reason: HOSPADM

## 2024-05-03 RX ORDER — HYDROXYZINE HYDROCHLORIDE 25 MG/1
100 TABLET, FILM COATED ORAL 3 TIMES DAILY PRN
Status: DISCONTINUED | OUTPATIENT
Start: 2024-05-03 | End: 2024-05-08 | Stop reason: HOSPADM

## 2024-05-03 RX ORDER — ACETAMINOPHEN 325 MG/1
650 TABLET ORAL EVERY 4 HOURS PRN
Status: DISCONTINUED | OUTPATIENT
Start: 2024-05-03 | End: 2024-05-08 | Stop reason: HOSPADM

## 2024-05-03 RX ORDER — MAGNESIUM HYDROXIDE/ALUMINUM HYDROXICE/SIMETHICONE 120; 1200; 1200 MG/30ML; MG/30ML; MG/30ML
30 SUSPENSION ORAL EVERY 6 HOURS PRN
Status: DISCONTINUED | OUTPATIENT
Start: 2024-05-03 | End: 2024-05-08 | Stop reason: HOSPADM

## 2024-05-03 RX ORDER — HYDROXYZINE HYDROCHLORIDE 25 MG/1
50 TABLET, FILM COATED ORAL 3 TIMES DAILY PRN
Status: DISCONTINUED | OUTPATIENT
Start: 2024-05-03 | End: 2024-05-03

## 2024-05-03 RX ORDER — LORAZEPAM 1 MG/1
1 TABLET ORAL EVERY 4 HOURS PRN
Status: DISCONTINUED | OUTPATIENT
Start: 2024-05-03 | End: 2024-05-08 | Stop reason: HOSPADM

## 2024-05-03 RX ORDER — POLYETHYLENE GLYCOL 3350 17 G
2 POWDER IN PACKET (EA) ORAL
Status: DISCONTINUED | OUTPATIENT
Start: 2024-05-03 | End: 2024-05-08 | Stop reason: HOSPADM

## 2024-05-03 RX ORDER — IBUPROFEN 400 MG/1
400 TABLET ORAL EVERY 6 HOURS PRN
Status: DISCONTINUED | OUTPATIENT
Start: 2024-05-03 | End: 2024-05-08 | Stop reason: HOSPADM

## 2024-05-03 RX ORDER — LURASIDONE HYDROCHLORIDE 40 MG/1
120 TABLET, FILM COATED ORAL
Status: DISCONTINUED | OUTPATIENT
Start: 2024-05-03 | End: 2024-05-08 | Stop reason: HOSPADM

## 2024-05-03 RX ORDER — DIVALPROEX SODIUM 500 MG/1
2000 TABLET, EXTENDED RELEASE ORAL NIGHTLY
Status: DISCONTINUED | OUTPATIENT
Start: 2024-05-03 | End: 2024-05-08 | Stop reason: HOSPADM

## 2024-05-03 RX ORDER — DOXEPIN HYDROCHLORIDE 50 MG/1
50 CAPSULE ORAL NIGHTLY PRN
Status: DISCONTINUED | OUTPATIENT
Start: 2024-05-03 | End: 2024-05-08 | Stop reason: HOSPADM

## 2024-05-03 RX ADMIN — DOXEPIN HYDROCHLORIDE 50 MG: 50 CAPSULE ORAL at 22:52

## 2024-05-03 RX ADMIN — DIVALPROEX SODIUM 2000 MG: 500 TABLET, EXTENDED RELEASE ORAL at 22:53

## 2024-05-03 RX ADMIN — HYDROXYZINE HYDROCHLORIDE 50 MG: 25 TABLET, FILM COATED ORAL at 15:23

## 2024-05-03 RX ADMIN — LURASIDONE HYDROCHLORIDE 120 MG: 40 TABLET, FILM COATED ORAL at 22:52

## 2024-05-03 RX ADMIN — NICOTINE POLACRILEX 2 MG: 2 LOZENGE ORAL at 15:23

## 2024-05-03 RX ADMIN — NICOTINE POLACRILEX 2 MG: 2 LOZENGE ORAL at 19:59

## 2024-05-03 RX ADMIN — BUSPIRONE HYDROCHLORIDE 10 MG: 10 TABLET ORAL at 22:52

## 2024-05-03 ASSESSMENT — SLEEP AND FATIGUE QUESTIONNAIRES
AVERAGE NUMBER OF SLEEP HOURS: 7
DO YOU HAVE DIFFICULTY SLEEPING: NO
DO YOU USE A SLEEP AID: YES
SLEEP PATTERN: NORMAL

## 2024-05-03 ASSESSMENT — LIFESTYLE VARIABLES
HOW MANY STANDARD DRINKS CONTAINING ALCOHOL DO YOU HAVE ON A TYPICAL DAY: 1 OR 2
HOW OFTEN DO YOU HAVE A DRINK CONTAINING ALCOHOL: MONTHLY OR LESS

## 2024-05-04 LAB
BASOPHILS ABSOLUTE: 0 THOU/MM3 (ref 0–0.1)
BASOPHILS NFR BLD AUTO: 0.5 %
DEPRECATED RDW RBC AUTO: 42.5 FL (ref 35–45)
EOSINOPHIL NFR BLD AUTO: 4 %
EOSINOPHILS ABSOLUTE: 0.2 THOU/MM3 (ref 0–0.4)
ERYTHROCYTE [DISTWIDTH] IN BLOOD BY AUTOMATED COUNT: 12.8 % (ref 11.5–14.5)
HCT VFR BLD AUTO: 44.1 % (ref 42–52)
HGB BLD-MCNC: 15 GM/DL (ref 14–18)
IMM GRANULOCYTES # BLD AUTO: 0.01 THOU/MM3 (ref 0–0.07)
IMM GRANULOCYTES NFR BLD AUTO: 0.2 %
LYMPHOCYTES ABSOLUTE: 1.3 THOU/MM3 (ref 1–4.8)
LYMPHOCYTES NFR BLD AUTO: 33.1 %
MCH RBC QN AUTO: 31.1 PG (ref 26–33)
MCHC RBC AUTO-ENTMCNC: 34 GM/DL (ref 32.2–35.5)
MCV RBC AUTO: 91.3 FL (ref 80–94)
MONOCYTES ABSOLUTE: 0.3 THOU/MM3 (ref 0.4–1.3)
MONOCYTES NFR BLD AUTO: 8.5 %
NEUTROPHILS ABSOLUTE: 2.1 THOU/MM3 (ref 1.8–7.7)
NEUTROPHILS NFR BLD AUTO: 53.7 %
NRBC BLD AUTO-RTO: 0 /100 WBC
PLATELET # BLD AUTO: 194 THOU/MM3 (ref 130–400)
PMV BLD AUTO: 12.3 FL (ref 9.4–12.4)
RBC # BLD AUTO: 4.83 MILL/MM3 (ref 4.7–6.1)
WBC # BLD AUTO: 4 THOU/MM3 (ref 4.8–10.8)

## 2024-05-04 PROCEDURE — 85025 COMPLETE CBC W/AUTO DIFF WBC: CPT

## 2024-05-04 PROCEDURE — 36415 COLL VENOUS BLD VENIPUNCTURE: CPT

## 2024-05-04 PROCEDURE — 1240000000 HC EMOTIONAL WELLNESS R&B

## 2024-05-04 PROCEDURE — 6370000000 HC RX 637 (ALT 250 FOR IP): Performed by: PSYCHIATRY & NEUROLOGY

## 2024-05-04 RX ORDER — DOXEPIN HYDROCHLORIDE 50 MG/1
50 CAPSULE ORAL NIGHTLY
Status: DISCONTINUED | OUTPATIENT
Start: 2024-05-04 | End: 2024-05-08 | Stop reason: HOSPADM

## 2024-05-04 RX ADMIN — BUSPIRONE HYDROCHLORIDE 10 MG: 10 TABLET ORAL at 09:41

## 2024-05-04 RX ADMIN — NICOTINE POLACRILEX 2 MG: 2 LOZENGE ORAL at 11:55

## 2024-05-04 RX ADMIN — LURASIDONE HYDROCHLORIDE 120 MG: 40 TABLET, FILM COATED ORAL at 13:51

## 2024-05-04 RX ADMIN — NICOTINE POLACRILEX 2 MG: 2 LOZENGE ORAL at 09:42

## 2024-05-04 RX ADMIN — LORAZEPAM 1 MG: 1 TABLET ORAL at 11:55

## 2024-05-04 NOTE — H&P
[address] [address]                           HISTORY & PHYSICAL      PATIENT NAME: KHOA RAO              : 1995  MED REC NO: [id number]                       ROOM: [facility]  ACCOUNT NO: [id number]                       ADMIT DATE: 2024  PROVIDER: Alverto Esquivel MD      IDENTIFYING INFORMATION:  The patient is a 29-year-old single  male.  He has no children.  He lives with his maternal grandparents.  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    CHIEF COMPLAINT:  \"I am here at your request.\"    HISTORY OF PRESENT ILLNESS:  The patient is a direct admit to the unit from [facility] on an emergency certificate.  He was taken to [facility] by Surinder GOMEZ.  He was in an argument with his grandmother and grabbed her by her arm.  Her grandmother was concerned and was fearful for her life.  She called 911.  The patient has a long history of bipolar disorder.  He was admitted on this unit about 3 weeks ago.  I saw him outpatient earlier this week with his grandparents.  Past few days, he has been very manic.  His speech is pressured.  He cannot stop talking.  He is extremely irritable.  He is very intrusive.  He has trouble sitting still.  He has flight of ideas.  He is extremely difficult to be redirected.  He states he has been taking his psychotropics as prescribed.  Even when I saw him outpatient, he was slightly hyper and was drinking 4 to 5 energy drinks daily, which I discouraged.  He also decided to start taking Strattera, which was prescribed by his previous provider in [place].  He denies psychotic symptoms.    PAST PSYCHIATRIC HISTORY:  See H and P on 2024.    FAMILY HISTORY:  See H and P on 2024.    SOCIAL HISTORY:  See H and P on 2024.    MEDICAL AND SURGICAL HISTORY:  Noncontributory.    MEDICATIONS:  Depakote ER 1500 mg at bedtime, buspirone 10 mg twice a day,

## 2024-05-05 PROCEDURE — 1240000000 HC EMOTIONAL WELLNESS R&B

## 2024-05-05 PROCEDURE — 6370000000 HC RX 637 (ALT 250 FOR IP): Performed by: PSYCHIATRY & NEUROLOGY

## 2024-05-05 RX ADMIN — HYDROXYZINE HYDROCHLORIDE 100 MG: 25 TABLET, FILM COATED ORAL at 17:48

## 2024-05-05 RX ADMIN — LORAZEPAM 1 MG: 1 TABLET ORAL at 09:19

## 2024-05-05 RX ADMIN — LURASIDONE HYDROCHLORIDE 120 MG: 40 TABLET, FILM COATED ORAL at 12:44

## 2024-05-05 RX ADMIN — BUSPIRONE HYDROCHLORIDE 10 MG: 10 TABLET ORAL at 01:15

## 2024-05-05 RX ADMIN — HYDROXYZINE HYDROCHLORIDE 100 MG: 25 TABLET, FILM COATED ORAL at 08:56

## 2024-05-05 RX ADMIN — NICOTINE POLACRILEX 2 MG: 2 LOZENGE ORAL at 15:22

## 2024-05-05 RX ADMIN — HYDROXYZINE HYDROCHLORIDE 100 MG: 25 TABLET, FILM COATED ORAL at 01:14

## 2024-05-05 RX ADMIN — NICOTINE POLACRILEX 2 MG: 2 LOZENGE ORAL at 01:19

## 2024-05-05 RX ADMIN — DIVALPROEX SODIUM 2000 MG: 500 TABLET, EXTENDED RELEASE ORAL at 01:15

## 2024-05-05 RX ADMIN — BUSPIRONE HYDROCHLORIDE 10 MG: 10 TABLET ORAL at 08:52

## 2024-05-05 RX ADMIN — DOXEPIN HYDROCHLORIDE 50 MG: 50 CAPSULE ORAL at 01:15

## 2024-05-05 RX ADMIN — NICOTINE POLACRILEX 2 MG: 2 LOZENGE ORAL at 09:21

## 2024-05-05 RX ADMIN — NICOTINE POLACRILEX 2 MG: 2 LOZENGE ORAL at 17:48

## 2024-05-05 NOTE — GROUP NOTE
Group Therapy Note    Date: 5/5/2024    Group Start Time: 0900  Group End Time: 0945  Group Topic: Community Meeting    [facility] 4[facility]    Renee Shultz        Group Therapy Note    Attendees: Patients shared their goals & discussed concerns on the unit.       Patient's Goal:  To \"suffer positively\".    Notes:  Patient walked in & out of group several times. When in group he was extremely fidgety, anxious & slightly aggressive when speaking.     Status After Intervention:  Unchanged    Participation Level: Minimal    Participation Quality: Resistant      Speech:  pressured      Thought Process/Content: Flight of ideas      Affective Functioning: Blunted      Mood: anxious and dysphoric      Level of consciousness:  Preoccupied      Response to Learning: Resistant      Endings: None Reported    Modes of Intervention: Education and Support      Discipline Responsible: Behavorial Health Tech      Signature:  Renee Shultz

## 2024-05-06 PROCEDURE — 1240000000 HC EMOTIONAL WELLNESS R&B

## 2024-05-06 PROCEDURE — 6370000000 HC RX 637 (ALT 250 FOR IP): Performed by: PSYCHIATRY & NEUROLOGY

## 2024-05-06 RX ADMIN — BUSPIRONE HYDROCHLORIDE 10 MG: 10 TABLET ORAL at 09:49

## 2024-05-06 RX ADMIN — NICOTINE POLACRILEX 2 MG: 2 LOZENGE ORAL at 16:26

## 2024-05-06 RX ADMIN — DOXEPIN HYDROCHLORIDE 50 MG: 50 CAPSULE ORAL at 20:57

## 2024-05-06 RX ADMIN — DIVALPROEX SODIUM 2000 MG: 500 TABLET, EXTENDED RELEASE ORAL at 20:57

## 2024-05-06 RX ADMIN — BUSPIRONE HYDROCHLORIDE 10 MG: 10 TABLET ORAL at 04:02

## 2024-05-06 RX ADMIN — LURASIDONE HYDROCHLORIDE 120 MG: 40 TABLET, FILM COATED ORAL at 12:11

## 2024-05-06 RX ADMIN — BUSPIRONE HYDROCHLORIDE 10 MG: 10 TABLET ORAL at 20:57

## 2024-05-06 RX ADMIN — NICOTINE POLACRILEX 2 MG: 2 LOZENGE ORAL at 18:53

## 2024-05-06 RX ADMIN — HYDROXYZINE HYDROCHLORIDE 100 MG: 25 TABLET, FILM COATED ORAL at 11:11

## 2024-05-06 RX ADMIN — DIVALPROEX SODIUM 2000 MG: 500 TABLET, EXTENDED RELEASE ORAL at 04:02

## 2024-05-06 RX ADMIN — DOXEPIN HYDROCHLORIDE 50 MG: 50 CAPSULE ORAL at 04:02

## 2024-05-06 RX ADMIN — NICOTINE POLACRILEX 2 MG: 2 LOZENGE ORAL at 04:02

## 2024-05-06 RX ADMIN — HYDROXYZINE HYDROCHLORIDE 100 MG: 25 TABLET, FILM COATED ORAL at 04:02

## 2024-05-06 ASSESSMENT — PAIN SCALES - GENERAL: PAINLEVEL_OUTOF10: 0

## 2024-05-06 NOTE — PATIENT CARE CONFERENCE
Behavioral Health   Day 3 Interdisciplinary Treatment Plan NOTE    Review Date & Time: 5/6/24 1554    Patient was in treatment team    Admission Type:   Admission Type: Involuntary    Reason for admission:  Reason for Admission: \"I don't know what happened, Dr. Esquviel said he wants to see me.\"  Estimated Length of Stay Update:  2-3 days  Estimated Discharge Date Update: 5/9/24       Addictive Behavior:   Medical Problems:  Past Medical History:   Diagnosis Date    ADHD (attention deficit hyperactivity disorder)     Anxiety     Bipolar 1 disorder (HCC)     Cannabis abuse     Panic disorder        Risk:  Fall Risk   Faustino Scale Faustino Scale Score: 22    Status EXAM:   Mental Status and Behavioral Exam  Normal: No  Level of Assistance: Independent/Self  Facial Expression: Flat  Affect: Blunt  Level of Consciousness: Alert  Frequency of Checks: 4 times per hour, close  Mood:Normal: No  Mood: Depressed, Labile, Anxious  Motor Activity:Normal: No  Motor Activity: Decreased  Eye Contact: Fair  Observed Behavior: Other (comment) (awakened cooperative with assessemnt return to sleeping)  Sexual Misconduct History: Current - no  Preception: [place] to person, [place] to time, [place] to place  Attention:Normal: No  Attention: Distractible  Thought Processes: Flight of ideas, Loose association  Thought Content:Normal: No  Thought Content: Paranoia, Preoccupations  Depression Symptoms: No problems reported or observed.  Anxiety Symptoms: Generalized  Ida Symptoms: Flight of ideas, Increased energy, Pressured speech, Labile  Hallucinations: None  Delusions: Yes  Delusions: Paranoid  Memory:Normal: No  Memory: Poor recent  Insight and Judgment: No  Insight and Judgment: Poor judgment, Poor insight, Unrealistic    Daily Assessment Last Entry:   Daily Sleep (WDL): Exceptions to WDL            Daily Nutrition (WDL): Exceptions to WDL  Appetite Change: Decreased  Barriers to Nutrition: None  Level of Assistance:

## 2024-05-06 NOTE — GROUP NOTE
Group Therapy Note    Date: 5/6/2024    Group Start Time: 1100  Group End Time: 1145  Group Topic: Healthy Living/Wellness    [facility] 4[facility]    Rosalia Smith LPN        Group Therapy Note    Attendees: 8       Notes:  attended    Status After Intervention:  Improved    Participation Level: Active Listener and Interactive    Participation Quality: Appropriate, Attentive, and Sharing      Speech:  normal      Thought Process/Content: Logical      Affective Functioning: Flat      Level of consciousness:  Alert, Oriented x4, and Attentive      Response to Learning: Able to verbalize current knowledge/experience, Able to verbalize/acknowledge new learning, Able to retain information, and Capable of insight      Endings: None Reported    Modes of Intervention: Education and Socialization      Discipline Responsible: Licensed Practical Nurse      Signature:  Rosalia Smith LPN

## 2024-05-07 LAB — VALPROATE SERPL-MCNC: 59.4 UG/ML (ref 50–100)

## 2024-05-07 PROCEDURE — 6370000000 HC RX 637 (ALT 250 FOR IP): Performed by: PSYCHIATRY & NEUROLOGY

## 2024-05-07 PROCEDURE — 1240000000 HC EMOTIONAL WELLNESS R&B

## 2024-05-07 PROCEDURE — 36415 COLL VENOUS BLD VENIPUNCTURE: CPT

## 2024-05-07 PROCEDURE — 80164 ASSAY DIPROPYLACETIC ACD TOT: CPT

## 2024-05-07 RX ADMIN — HYDROXYZINE HYDROCHLORIDE 100 MG: 25 TABLET, FILM COATED ORAL at 08:23

## 2024-05-07 RX ADMIN — NICOTINE POLACRILEX 2 MG: 2 LOZENGE ORAL at 10:14

## 2024-05-07 RX ADMIN — NICOTINE POLACRILEX 2 MG: 2 LOZENGE ORAL at 08:05

## 2024-05-07 RX ADMIN — NICOTINE POLACRILEX 2 MG: 2 LOZENGE ORAL at 20:44

## 2024-05-07 RX ADMIN — BUSPIRONE HYDROCHLORIDE 10 MG: 10 TABLET ORAL at 21:06

## 2024-05-07 RX ADMIN — BUSPIRONE HYDROCHLORIDE 10 MG: 10 TABLET ORAL at 08:24

## 2024-05-07 RX ADMIN — LURASIDONE HYDROCHLORIDE 120 MG: 40 TABLET, FILM COATED ORAL at 13:04

## 2024-05-07 RX ADMIN — DIVALPROEX SODIUM 2000 MG: 500 TABLET, EXTENDED RELEASE ORAL at 21:06

## 2024-05-07 RX ADMIN — NICOTINE POLACRILEX 2 MG: 2 LOZENGE ORAL at 15:15

## 2024-05-07 RX ADMIN — DOXEPIN HYDROCHLORIDE 50 MG: 50 CAPSULE ORAL at 21:05

## 2024-05-07 ASSESSMENT — PAIN SCALES - GENERAL: PAINLEVEL_OUTOF10: 0

## 2024-05-07 NOTE — DISCHARGE INSTR - DIET

## 2024-05-07 NOTE — DISCHARGE INSTRUCTIONS
[facility] Hotline:  [phone]    Crisis phone numbers:  [facility], and [facility] [phone].  [facility], and [facility] [phone]  [facility] [phone].  [facility] and [facility] [phone].  [facility] [phone].  [facility] [phone].    [facility] Professional Services  [address], [place] [address]  [phone]    [facility]  [address] [facility]  [address], [place] [address]  [place], [place] [id number] [phone]    [facility] Behavioral [facility]  [address] [address] [place] [facility] [place] [address]  [phone]    [facility]  [address] [place] [address]  [phone]    [facility]  [address] [place] [address]  [phone]    [facility] Professional Services  [address] [place], [place] [id number] [phone]    [facility]  [address] [place] [address]  [phone]    [facility]  Recovery and Wellness Center  [address], [place] [address] [phone]    [facility] Services  [address] [address]  [phone]    [address] [address]  [phone]    [facility]  [address] [place], [place] [address] [phone]    [facility]  [address] [place] [address]  [phone]    [facility]  [address] [place], [place] [address] [phone]

## 2024-05-08 VITALS
WEIGHT: 185 LBS | BODY MASS INDEX: 29.03 KG/M2 | TEMPERATURE: 98.2 F | SYSTOLIC BLOOD PRESSURE: 123 MMHG | HEIGHT: 67 IN | HEART RATE: 90 BPM | OXYGEN SATURATION: 92 % | RESPIRATION RATE: 18 BRPM | DIASTOLIC BLOOD PRESSURE: 87 MMHG

## 2024-05-08 PROCEDURE — 5130000000 HC BRIDGE APPOINTMENT

## 2024-05-08 PROCEDURE — 6370000000 HC RX 637 (ALT 250 FOR IP): Performed by: PSYCHIATRY & NEUROLOGY

## 2024-05-08 RX ORDER — HYDROXYZINE 50 MG/1
TABLET, FILM COATED ORAL
Qty: 150 TABLET | Refills: 0 | Status: SHIPPED | OUTPATIENT
Start: 2024-05-08

## 2024-05-08 RX ORDER — DOXEPIN HYDROCHLORIDE 50 MG/1
50 CAPSULE ORAL NIGHTLY PRN
Qty: 30 CAPSULE | Refills: 0 | Status: SHIPPED | OUTPATIENT
Start: 2024-05-08

## 2024-05-08 RX ORDER — LURASIDONE HYDROCHLORIDE 120 MG/1
120 TABLET, FILM COATED ORAL
Qty: 30 TABLET | Refills: 0 | Status: SHIPPED | OUTPATIENT
Start: 2024-05-08

## 2024-05-08 RX ORDER — DIVALPROEX SODIUM 500 MG/1
2000 TABLET, EXTENDED RELEASE ORAL NIGHTLY
Qty: 120 TABLET | Refills: 0 | Status: SHIPPED | OUTPATIENT
Start: 2024-05-08

## 2024-05-08 RX ORDER — BUSPIRONE HYDROCHLORIDE 10 MG/1
10 TABLET ORAL 2 TIMES DAILY
Qty: 60 TABLET | Refills: 0 | Status: SHIPPED | OUTPATIENT
Start: 2024-05-08

## 2024-05-08 RX ADMIN — BUSPIRONE HYDROCHLORIDE 10 MG: 10 TABLET ORAL at 08:31

## 2024-05-08 RX ADMIN — NICOTINE POLACRILEX 2 MG: 2 LOZENGE ORAL at 02:40

## 2024-05-08 ASSESSMENT — PAIN SCALES - GENERAL: PAINLEVEL_OUTOF10: 0

## 2024-05-08 NOTE — BH NOTE
Goal Wrap-Up/Relaxation Group    Date: 5/6/2024  Start Time: 2000  End Time:  2020    Type of Group: Goal Wrap Up and Relaxation    States that goal today was: To get some sleep    Goal for today was Still working on it     Patient Participated in group/activities appropriately      Signature: Ainsley Glass RN  
                Goal Wrap-Up/Relaxation Group    Date: 5/7/2024  Start Time: 2000  End Time:  2020    Type of Group: Goal Wrap Up and Relaxation    States that goal today was: To make it through the day    Goal for today was Met    Patient Participated in group/activities appropriately      Signature: Ainsley Glass RN  
24 hour chart review completed  
24 hour chart review completed.  
24 hour chart review completed.  
Patient did not attend groups, new patient  
This RN has reviewed and agrees with IVON Patel LPN's data collection, hourly safety checks, and has collaborated with this LPN regarding the patient's care plan.  
Content: Paranoia  Depression Symptoms: No problems reported or observed.  Anxiety Symptoms: Generalized  Ida Symptoms: No problems reported or observed.  Hallucinations: None  Delusions: No  Delusions: Paranoid  Memory:Normal: No  Memory: Poor recent  Insight and Judgment: No  Insight and Judgment: Poor judgment, Poor insight    Renae Patel LPN  
and calm down. This was after he had taken the Ativan and had been acting out for close to a half hour. Patient did take the rest of his prescribed medications when they arrived and then went to bed.

## 2024-05-08 NOTE — DISCHARGE SUMMARY
Physician Discharge Summary     Patient ID:  Adalid Elmore  [id number]  29 y.o.  1995    Admit date: 5/3/2024    Discharge date and time: 5/8/2024  8:14 AM     Admitting Physician: Alverto Esquivel MD     Discharge Physician: Alverto Esquivel MD      Admission Diagnoses: Bipolar 1 disorder, manic, moderate (HCC) [F31.12]    IDENTIFYING INFORMATION: ***    HISTORY OF PRESENT ILLNESS: ***    MENTAL STATUS EXAMINATION AT ADMISSION: See H and P.    Discharge Diagnoses:   Bipolar I disorder, current or most recent episode manic, severe (HCC)     Past Medical History:   Diagnosis Date    ADHD (attention deficit hyperactivity disorder)     Anxiety     Bipolar 1 disorder (HCC)     Cannabis abuse     Panic disorder         Admission Condition: poor    Discharged Condition: stable    Indication for Admission: Manic symptoms    Significant Diagnostic Studies:   See Results Review tab in EHR      Depakote level:   Recent Labs     05/07/24  0716   VALPROATE 59.4       TREATMENT AND CLINICAL COURSE:   Patient was admitted on the unit. Routine lab was ordered. Physical examination was within normal limits. At admission, I resumed his psychotropics but discontinued atomoxetine, increased Depakote ER, lurasidone and hydroxyzine.  Due to possible agitation lorazepam and ziprasidone were added.  He took lorazepam twice but did not require IM ziprasidone.  He was very manic upon admission but did well with the increase of his psychotropics. Patient did not have side effect from medications. Patient was involved in group and milieu therapy. Patient did not have suicidal thought during this hospital stay. I strongly encouraged patient's sobriety from cannabis.  I spent some time discussing the effect of cannabis on patient's mood. Toward the end of the hospital stay, patient become more hopeful. Overall, hospital stay was uncomplicated, and patient was discharged in stable condition.    Consults: none    Treatments: Psychotropic

## 2024-05-08 NOTE — TRANSITION OF CARE
Behavioral Health Transition Record to Provider    Patient Name: Adalid Elmore  YOB: 1995   Medical Record Number: [id number]  Date of Admission: 5/3/2024  2:13 PM   Date of Discharge: 05/08/2024    Attending Provider: Alverto Esquivel MD   Discharging Provider: Home  To contact this individual call   and ask the  to page.  If unavailable, ask to be transferred to Behavioral Health Provider on call.  A Behavioral Health Provider will be available on call 24/7 and during holidays.    Primary Care Provider: No primary care provider on file.    Allergies   Allergen Reactions    Pcn [Penicillins] Other (See Comments)     \"As a baby,  reacted very badly\"       Reason for Admission: Bipolar I disorder    Admission Diagnosis: Bipolar 1 disorder, manic, moderate (HCC) [F31.12]    * No surgery found *    Results for orders placed or performed during the hospital encounter of 05/03/24   TSH without Reflex   Result Value Ref Range    TSH 0.859 0.400 - 4.200 uIU/mL   T4, free   Result Value Ref Range    T4 Free 1.07 0.93 - 1.68 ng/dL   Valproic acid (DEPAKOTE) level   Result Value Ref Range    Valproic Acid Lvl 22.7 (L) 50.0 - 100.0 ug/mL   CBC auto differential   Result Value Ref Range    WBC 4.0 (L) 4.8 - 10.8 thou/mm3    RBC 4.83 4.70 - 6.10 mill/mm3    Hemoglobin 15.0 14.0 - 18.0 gm/dl    Hematocrit 44.1 42.0 - 52.0 %    MCV 91.3 80.0 - 94.0 fL    MCH 31.1 26.0 - 33.0 pg    MCHC 34.0 32.2 - 35.5 gm/dl    RDW-CV 12.8 11.5 - 14.5 %    RDW-SD 42.5 35.0 - 45.0 fL    Platelets 194 130 - 400 thou/mm3    MPV 12.3 9.4 - 12.4 fL    Seg Neutrophils 53.7 %    Lymphocytes 33.1 %    Monocytes % 8.5 %    Eosinophils 4.0 %    Basophils 0.5 %    Immature Granulocytes % 0.2 %    Neutrophils Absolute 2.1 1.8 - 7.7 thou/mm3    Lymphocytes Absolute 1.3 1.0 - 4.8 thou/mm3    Monocytes Absolute 0.3 (L) 0.4 - 1.3 thou/mm3    Eosinophils Absolute 0.2 0.0 - 0.4 thou/mm3    Basophils Absolute 0.0 0.0 - 0.1 thou/mm3    Immature

## 2024-05-08 NOTE — PROGRESS NOTES
Group Therapy Note    Date: 5/7/2024  Start Time: 1100  End Time:  1120  Number of Participants: 7    Type of Group: Healthy Living/Wellness    Wellness Binder Information  Module Name:    Session Number:      Patient's Goal:      Notes:  attended     Status After Intervention:  Unchanged    Participation Level: Active Listener and Interactive    Participation Quality: Appropriate      Speech:  normal      Thought Process/Content: Logical      Affective Functioning: Congruent      Mood: euthymic      Level of consciousness:  Alert      Response to Learning: Able to retain information      Endings: None Reported    Modes of Intervention: Education and Exploration      Discipline Responsible: Registered Nurse-ONU nursing students       Signature:  Jazmine Bearden RN, BSN-Clinical Instructor     
                                                                    Group Therapy Note    Date: 5/7/2024  Start Time: 1330  End Time:  1430  Number of Participants: 6    Type of Group: Psychotherapy    Notes:  Patient was present in group. Group members identified positive attributes of themselves. Patients shared about how resiliency has played a role in their recovery journey. Members identified their passions and discussed importance of using coping skills. Patient attempted to discuss marijuana use but was able to be redirected easily. Patient appears to be on the fringe with peers per body language. Patient has less tangential thought process.     Status After Intervention:  Unchanged    Participation Level: Active Listener and Interactive    Participation Quality: Attentive, Sharing, and Supportive      Speech:  pressured and loud      Thought Process/Content: Logical  Linear      Affective Functioning: Congruent      Mood: euthymic      Level of consciousness:  Alert, Oriented x4, and Attentive      Response to Learning: Able to verbalize current knowledge/experience, Able to verbalize/acknowledge new learning, Able to retain information, Capable of insight, Able to change behavior, and Progressing to goal      Endings: None Reported    Modes of Intervention: Education, Support, Socialization, Exploration, Clarifying, and Problem-solving      Discipline Responsible: /Counselor      Signature:  RODDY Terry  
                                                           Psychosocial Assessment    Current Level of Psychosocial Functioning     Independent   Dependent         XXX  Minimal Assist     Comments:      Psychosocial High Risk Factors (check all that apply)    Unable to obtain meds   Chronic illness/pain    Substance abuse   Lack of Family Support   Financial stress   Isolation   Inadequate Community Resources  Suicide attempt(s)  Not taking medications   Victim of crime   Developmental Delay  Unable to manage personal needs    Age 65 or older   Homeless  No transportation   Readmission within 30 days     XXX  Unemployment  Traumatic Event    Family/Supports identified: Family    Sexual Orientation:  Heterosexual    Patient Strengths: Support system, stable housing, connected with outpatient services    Patient Barriers: Non-compliance    Safety plan: Contracts for safety    CMHC/ history: Patient was admitted to this unit from 4/8/24 and discharged 4/15/24.     Plan of Care:  medication management, group/individual therapies, family meetings, psycho -education, treatment team meetings to assist with stabilization    Initial Discharge Plan:  Patient is a current client of Dr. Esquivel at [facility].     Clinical Summary:  Adalid is a 29 year old male who was admitted to this unit as a direct admit on a [facility]. Per nursing admission note, \"Patients grandmother reportedly called the police after \"Adalid was scaring her.\" Reportedly patient has been manic, and was teasing the dog.\" The grandmother asked patient to stop, he grabbed her arm and wouldn't let go, so she locked herself in a room.\". Patient stated his friends \"gave me some Irasema to do this weekend\". UDS not obtained. Patient was previously admitted to this unit from 4/8/24-4/15/24.       
0818- Patient completed discharge OQ.   
Daily Progress Note  Alverto Esquivel MD  5/5/2024    Reviewed patient's current plan of care and vital signs with nursing staff.  Sleep:  7.5 hours last night broken  Attending groups: Yes but in and out  No reported Suicidal thought. No interaction with peers & staff.  He is irritable at times with less pressured speech.  Due to some agitation this morning, he received hydroxyzine and lorazepam.    SUBJECTIVE:    Patient is feeling better. SUICIDAL IDEATION denies suicidal ideation.  Patient does not have medication side effects.    ROS: Patient has new complaints:  No  Sleeping adequately:  Yes  Visitors: No    Mental Status Examination:  Patient is cooperative. Speech: Normal rate and tone, less pressured.  No abnormal movements, tics or mannerisms.  Mood irritable; affect labile affect. Suicidal ideation Absent.  Homicidal ideations Absent.   Hallucinations Absent.  Delusions Absent. Thought Content: less tangential. Thought Processes: less Flight of Ideas. Alert and oriented X 3.  Attention and concentration fair. MEMORY intact.  Insight and Judgement impaired judgment.      Data   height is 1.702 m (5' 7\") and weight is 83.9 kg (185 lb). His tympanic temperature is 97.7 °F (36.5 °C). His blood pressure is 155/102 (abnormal) and his pulse is 103 (abnormal). His respiration is 18 and oxygen saturation is 99%.   Labs:            Medications  Current Facility-Administered Medications: doxepin (SINEQUAN) capsule 50 mg, 50 mg, Oral, Nightly  acetaminophen (TYLENOL) tablet 650 mg, 650 mg, Oral, Q4H PRN  ibuprofen (ADVIL;MOTRIN) tablet 400 mg, 400 mg, Oral, Q6H PRN  magnesium hydroxide (MILK OF MAGNESIA) 400 MG/5ML suspension 30 mL, 30 mL, Oral, Daily PRN  aluminum & magnesium hydroxide-simethicone (MAALOX) 200-200-20 MG/5ML suspension 30 mL, 30 mL, Oral, Q6H PRN  nicotine polacrilex (COMMIT) lozenge 2 mg, 2 mg, Oral, Q2H PRN  ziprasidone (GEODON) 10 mg in sterile water 0.5 mL injection, 10 mg, IntraMUSCular, TID 
Daily Progress Note  Alverto Esquivel MD  5/6/2024    Reviewed patient's current plan of care and vital signs with nursing staff.  Sleep:  8 hours last night broken  Attending groups: No  No reported Suicidal thought. Limited interaction with peers & staff.  He is still irritable at times, no agitation.  He was rude to his grandmother on the phone last night but apologized later.  He went to bed early and woke up at 4 AM and took his evening meds.    SUBJECTIVE:    Patient is feeling better. SUICIDAL IDEATION denies suicidal ideation.  Patient does not have medication side effects.    ROS: Patient has new complaints:  No  Sleeping adequately:  Yes  Visitors: No    Mental Status Examination:  Patient is cooperative. Speech: Normal rate and tone, not pressured.  No abnormal movements, tics or mannerisms.  Mood dysthymic; affect less labile. Suicidal ideation Absent.  Homicidal ideations Absent.   Hallucinations Absent.  Delusions Absent. Thought Content: less tangential. Thought Processes: Goal oriented. Alert and oriented X 3.  Attention and concentration fair. MEMORY intact.  Insight and Judgement impaired judgment.      Data   height is 1.702 m (5' 7\") and weight is 83.9 kg (185 lb). His oral temperature is 98.1 °F (36.7 °C). His blood pressure is 137/85 and his pulse is 111 (abnormal). His respiration is 20 and oxygen saturation is 99%.   Labs:            Medications  Current Facility-Administered Medications: doxepin (SINEQUAN) capsule 50 mg, 50 mg, Oral, Nightly  acetaminophen (TYLENOL) tablet 650 mg, 650 mg, Oral, Q4H PRN  ibuprofen (ADVIL;MOTRIN) tablet 400 mg, 400 mg, Oral, Q6H PRN  magnesium hydroxide (MILK OF MAGNESIA) 400 MG/5ML suspension 30 mL, 30 mL, Oral, Daily PRN  aluminum & magnesium hydroxide-simethicone (MAALOX) 200-200-20 MG/5ML suspension 30 mL, 30 mL, Oral, Q6H PRN  nicotine polacrilex (COMMIT) lozenge 2 mg, 2 mg, Oral, Q2H PRN  ziprasidone (GEODON) 10 mg in sterile water 0.5 mL injection, 10 mg, 
Daily Progress Note  Alverto Esquivel MD  5/7/2024    Reviewed patient's current plan of care and vital signs with nursing staff.  Sleep:  8 hours last night   Attending groups: Yes to some  No reported Suicidal thought. Limited interaction with peers & staff.  No agitation but he is somewhat upset being on this unit. Mood 4 earlier, 9 currently on a scale of 1 to 10 with 10 is feeling normal.  He would like to be discharged.  He is more hopeful after we discussed possible discharge tomorrow.  He is a fast metabolizer since his Depakote level is low normal.    SUBJECTIVE:    Patient is feeling better. SUICIDAL IDEATION denies suicidal ideation.  Patient does not have medication side effects.    ROS: Patient has new complaints:  No  Sleeping adequately:  Yes  Visitors: No    Mental Status Examination:  Patient is cooperative. Speech: Normal rate and tone, not pressured.  No abnormal movements, tics or mannerisms.  Mood euthymic; affect appropriate. Suicidal ideation Absent.  Homicidal ideations Absent.   Hallucinations Absent.  Delusions Absent. Thought Content: Normal. Thought Processes: Goal oriented. Alert and oriented X 3.  Attention and concentration fair. MEMORY intact.  Insight and Judgement fair.      Data   height is 1.702 m (5' 7\") and weight is 83.9 kg (185 lb). His oral temperature is 98.1 °F (36.7 °C). His blood pressure is 119/80 and his pulse is 85. His respiration is 16 and oxygen saturation is 99%.   Labs:    Latest Reference Range & Units 05/07/24 07:16   Valproic Acid Lvl 50.0 - 100.0 ug/mL 59.4            Medications  Current Facility-Administered Medications: doxepin (SINEQUAN) capsule 50 mg, 50 mg, Oral, Nightly  acetaminophen (TYLENOL) tablet 650 mg, 650 mg, Oral, Q4H PRN  ibuprofen (ADVIL;MOTRIN) tablet 400 mg, 400 mg, Oral, Q6H PRN  magnesium hydroxide (MILK OF MAGNESIA) 400 MG/5ML suspension 30 mL, 30 mL, Oral, Daily PRN  aluminum & magnesium hydroxide-simethicone (MAALOX) 200-200-20 MG/5ML 
Daily Progress Note  Alverto Esquivel MD  5/8/2024    Reviewed patient's current plan of care and vital signs with nursing staff.  Sleep:  8.5 hours last night   Attending groups: Yes  No reported Suicidal thought.  Good interaction with peers & staff.  No agitation or having manic symptoms. Mood 8 on a scale of 1 to 10 with 10 is feeling normal.  He feels ready to be discharged.    SUBJECTIVE:    Patient is feeling better. SUICIDAL IDEATION denies suicidal ideation.  Patient does not have medication side effects.    ROS: Patient has new complaints:  No  Sleeping adequately:  Yes  Visitors: No    Mental Status Examination:  Patient is cooperative. Speech: Normal rate and tone, not pressured.  No abnormal movements, tics or mannerisms.  Mood euthymic; affect appropriate. Suicidal ideation Absent.  Homicidal ideations Absent.   Hallucinations Absent.  Delusions Absent. Thought Content: Normal. Thought Processes: Goal oriented. Alert and oriented X 3.  Attention and concentration fair. MEMORY intact.  Insight and Judgement fair.      Data   height is 1.702 m (5' 7\") and weight is 83.9 kg (185 lb). His oral temperature is 98.2 °F (36.8 °C). His blood pressure is 116/71 and his pulse is 81. His respiration is 18 and oxygen saturation is 99%.   Labs:    Latest Reference Range & Units 05/07/24 07:16   Valproic Acid Lvl 50.0 - 100.0 ug/mL 59.4            Medications  Current Facility-Administered Medications: doxepin (SINEQUAN) capsule 50 mg, 50 mg, Oral, Nightly  acetaminophen (TYLENOL) tablet 650 mg, 650 mg, Oral, Q4H PRN  ibuprofen (ADVIL;MOTRIN) tablet 400 mg, 400 mg, Oral, Q6H PRN  magnesium hydroxide (MILK OF MAGNESIA) 400 MG/5ML suspension 30 mL, 30 mL, Oral, Daily PRN  aluminum & magnesium hydroxide-simethicone (MAALOX) 200-200-20 MG/5ML suspension 30 mL, 30 mL, Oral, Q6H PRN  nicotine polacrilex (COMMIT) lozenge 2 mg, 2 mg, Oral, Q2H PRN  ziprasidone (GEODON) 10 mg in sterile water 0.5 mL injection, 10 mg, 
Discharge planning 1107- Adalid is to go to [place] for an appointment with Dr. Esquivel on Tuesday, May 14 at 11:40am.   
OQ Admission    Most Recent Score:    53    Baseline Score:   53    Change From Initial: No Reliable Change  Distress Level: Mild - Moderate       Graph Type:     Total  Most Recent Critical Item Status:  7.     Suicide - I have thoughts of ending my life. Never  11.     Substance Abuse - I use alcohol or a drug to get going in the morning. Rarely  20.     Substance Abuse - People criticize my drinking (or drug use). Rarely  24.     Substance Abuse - I have trouble at work/school or other daily activities because of drinking or drug use. Rarely  
Pt pacing the hallway when approached for assessment pt has pressuree  
Spiritual Support Group Note    Number of Participants in Group: 3                               Time: [id number]     Goal:The spirituality group focused on teaching boundaries in relation to time. The goal is to establish a day every week to sabbath. For sabbathing the goal is to reflect on self/family/divine and establish a time every week that is holy (set aside) for rest and reflection.    Topic:  [x] Spiritual Wellness and Self Care                  [] Hope                     [] Connecting with Divine/Others        [] Thankfulness and Gratitude               []  Meaningfulness and Purpose               [x] Forgiveness               [] Peace               [x] Connect to Community     [] Other:    Participation Level:   [x] Active Listener   [] Minimal   [] Monopolizing   [x] Interactive   [] No Participation   []  Other:     Attention:   [x] Alert   [] Distractible   [x] Drowsy   [] Poor   [] Other:    Manner:   [x] Cooperative   [] Suspicious   [] Withdrawn   [] Guarded   [] Irritable   [] Inhospitable   [] Other:     Others Comments from Group: The patient was actively involved in the group and reflective. The patient showed dramatic improvement compared to the previous time the patient was encountered. The patient took the lesson seriously and reflected on the issue.     
           Medications  Current Facility-Administered Medications: acetaminophen (TYLENOL) tablet 650 mg, 650 mg, Oral, Q4H PRN  ibuprofen (ADVIL;MOTRIN) tablet 400 mg, 400 mg, Oral, Q6H PRN  magnesium hydroxide (MILK OF MAGNESIA) 400 MG/5ML suspension 30 mL, 30 mL, Oral, Daily PRN  aluminum & magnesium hydroxide-simethicone (MAALOX) 200-200-20 MG/5ML suspension 30 mL, 30 mL, Oral, Q6H PRN  nicotine polacrilex (COMMIT) lozenge 2 mg, 2 mg, Oral, Q2H PRN  ziprasidone (GEODON) 10 mg in sterile water 0.5 mL injection, 10 mg, IntraMUSCular, TID PRN  busPIRone (BUSPAR) tablet 10 mg, 10 mg, Oral, BID  divalproex (DEPAKOTE ER) extended release tablet 2,000 mg, 2,000 mg, Oral, Nightly  doxepin (SINEQUAN) capsule 50 mg, 50 mg, Oral, Nightly PRN  lurasidone (LATUDA) tablet 120 mg, 120 mg, Oral, Lunch  LORazepam (ATIVAN) tablet 1 mg, 1 mg, Oral, Q4H PRN  hydrOXYzine HCl (ATARAX) tablet 100 mg, 100 mg, Oral, TID PRN    ASSESSMENT  Bipolar I disorder, current or most recent episode manic, severe (HCC)     PLAN  Patient's symptoms are improving some  Continue with current medications, increase doxepin.  Attempt to develop insight  Psycho-education conducted.  Supportive Therapy conducted.      
security envelope, number:  [id number]. Patient's home medications were locked in cabinet.  Patient oriented to surroundings and program expectations and copy of patient rights given. Received admission packet:  Yes  Consents reviewed, signed Yes. Outcomes Questionnaire completed {Responses; yes/refused/notapproropriate:Yes.  \"An Important Message from Medicare About Your Rights\" form reviewed, signed: N/A .  \"An Important Message from [employer] About Your Rights\" form reviewed, signed: N/A  Patient verbalize understanding: Yes.    Patient informed of 15 minute safety monitoring: YES/NO/NA: yes          Patient screened positive for suicide risk on CSSR-S (\"yes\" to question #4, 5, OR 6)  no.  Physician notified of risk score n/a  Constant Observer Orders received N/A .   2 person skin assessment completed upon admission patient declined..    Explained patients right to have family, representative or physician notified of their admission.  Patient has Declined for physician to be notified.  Patient has Declined for family/representative to be notified.    Provided pt with [employer] handout entitled \"Quitting Smoking.\"  Reviewed handout with pt addressing dangers of smoking, developing coping skills, and providing basic information about quitting.  Pt response to counseling:  patient denied wanting to quit at this time.    Admission summary: Patient admitted directly from [facility] in [place]. Patients grandmother reportedly called the police after \"Adalid was scaring her.\" Reportedly patient has been manic, and was teasing the dog.\" The grandmother asked patient to stop, he grabbed her arm and wouldn't let go, so she locked herself in a room. Upon admission, patient is hyper verbal and irritable about being admitted, however cooperative. Patient is also labile and began to cry when talking about a new friend that he made at work \"that gave him some Irasema to do this weekend.\"  Patient is not

## 2024-05-08 NOTE — PLAN OF CARE
Problem: Ida  Goal: Will exhibit normal sleep and speech and no impulsivity  Description: INTERVENTIONS:  1. Administer medication as ordered  2. Set limits on impulsive behavior  3. Make attempts to decrease external stimuli as possible  5/4/2024 0534 by Sobeida Rivera, RN  Outcome: Not Progressing  Note: Patient has inappropriate behaviors, speech and impulsive behaviors and is difficult to redirect  5/4/2024 0234 by Sobeida Rivera, RN  Outcome: Progressing     Problem: Psychosis  Goal: Will report no hallucinations or delusions  Description: INTERVENTIONS:  1. Administer medication as  ordered  2. Assist with reality testing to support increasing orientation  3. Assess if patient's hallucinations or delusions are encouraging self harm or harm to others and intervene as appropriate  5/4/2024 0534 by Sobeida Rivera, RN  Outcome: Not Progressing  Note: Patient denies hallucinations but states he went 24 hours into the future at his grandmothers and that people do not understand that he can see into other dimensions  5/4/2024 0234 by Sobeida Rivera, RN  Outcome: Progressing     Problem: Anxiety  Goal: Will report anxiety at manageable levels  Description: INTERVENTIONS:  1. Administer medication as ordered  2. Teach and rehearse alternative coping skills  3. Provide emotional support with 1:1 interaction with staff  5/4/2024 0534 by Sobeida Rivera, RN  Outcome: Not Progressing  Note: Patient is manic with high anxiety, outbursts, verbally aggressive at times  5/4/2024 0234 by Sobeida Rivera, RN  Outcome: Progressing  Flowsheets (Taken 5/3/2024 1444 by Soledad Mishra, RN)  Will report anxiety at manageable levels:   Provide emotional support with 1:1 interaction with staff   Administer medication as ordered     Problem: Involuntary Admit  Goal: Will cooperate with staff recommendations and doctor's orders and will demonstrate appropriate behavior  Description: INTERVENTIONS:  1. Treat underlying conditions and 
  Problem: Psychosis  Goal: Will report no hallucinations or delusions  Description: INTERVENTIONS:  1. Administer medication as  ordered  2. Assist with reality testing to support increasing orientation  3. Assess if patient's hallucinations or delusions are encouraging self harm or harm to others and intervene as appropriate  5/4/2024 1514 by Nayeli Foy, RN  Outcome: Not Progressing  Note: Pt remains delusional discussing how he can see into \"other dimensions\" but most people don't understand his \"gifts\" pt provided with support. Pt compliant with his scheduled and prn medications  5/4/2024 0534 by Sobeida Rivera, RN  Outcome: Not Progressing  Note: Patient denies hallucinations but states he went 24 hours into the future at his grandmothers and that people do not understand that he can see into other dimensions  5/4/2024 0234 by Sobeida Rivera, RN  Outcome: Progressing     Problem: Risk for Elopement  Goal: Patient will not exit the unit/facility without proper excort  5/4/2024 1514 by Nayeli Foy, RN  Outcome: Progressing  Flowsheets (Taken 5/3/2024 1506 by Soledad Mishra, RN)  Nursing Interventions for Elopement Risk: Assist with personal care needs such as toileting, eating, dressing, as needed to reduce the risk of wandering  Note: Pt has been sleeping most of the day, when he was awake, observed pacing the hallway and easily agitated. No attempts observed to exit the unit-pt remains with 15 min visual checks as ordered  5/4/2024 0534 by Sobeida Rivera, RN  Outcome: Progressing  Note: Patient made no attempt to leave the unit  5/4/2024 0532 by Sobeida Rivera, RN  Outcome: Progressing  5/4/2024 0234 by Sobeida Rivera, RN  Outcome: Progressing  Flowsheets (Taken 5/3/2024 1506 by Soledad Mishra, RN)  Nursing Interventions for Elopement Risk: Assist with personal care needs such as toileting, eating, dressing, as needed to reduce the risk of wandering     Problem: Ida  Goal: Will 
  Problem: Risk for Elopement  Goal: Patient will not exit the unit/facility without proper excort  5/5/2024 2234 by Nayeli Foy, RN  Outcome: Progressing  Flowsheets (Taken 5/5/2024 2228)  Nursing Interventions for Elopement Risk: Assist with personal care needs such as toileting, eating, dressing, as needed to reduce the risk of wandering  Note: Pt has made no attempts to exit the unit visual 15 min checks continue  5/5/2024 1116 by Nayeli Foy, RN  Outcome: Progressing  Flowsheets (Taken 5/5/2024 0952)  Nursing Interventions for Elopement Risk: Assist with personal care needs such as toileting, eating, dressing, as needed to reduce the risk of wandering  Note: Pt has not been seen attempting to exit the unit. He has been seen pacing the hallways but not checking the doors to exit the unit     Problem: Ida  Goal: Will exhibit normal sleep and speech and no impulsivity  Description: INTERVENTIONS:  1. Administer medication as ordered  2. Set limits on impulsive behavior  3. Make attempts to decrease external stimuli as possible  5/5/2024 2234 by Nayeli Foy, RN  Outcome: Progressing  Note: Pt sleeping he was initially cooperative with assessment but returned to sleep  5/5/2024 1116 by Nayeli Foy, RN  Outcome: Progressing  Note: Pt slept 7.5 hrs broken sleep was awake earlier with high anxiety. He initially open to taking atarax to help decrease anxiety but return to nurses station with loud voice pressured speech outer tremors reporting \"I need something else I feel like I'm going to loose it and if I loose it I won't be discharged\"     Problem: Psychosis  Goal: Will report no hallucinations or delusions  Description: INTERVENTIONS:  1. Administer medication as  ordered  2. Assist with reality testing to support increasing orientation  3. Assess if patient's hallucinations or delusions are encouraging self harm or harm to others and intervene as 
  Problem: Risk for Elopement  Goal: Patient will not exit the unit/facility without proper excort  5/7/2024 0001 by Ainsley Glass, RN  Outcome: Progressing  Flowsheets  Taken 5/7/2024 0001  Nursing Interventions for Elopement Risk:   Make sure patient has all necessary personal care items   Reduce environmental triggers  Taken 5/6/2024 2354  Nursing Interventions for Elopement Risk:   Make sure patient has all necessary personal care items   Reduce environmental triggers  Note: No actions or comments regarding leaving the unit noted so far this shift.  5/6/2024 1643 by Colleen Barry, RN  Outcome: Progressing  Note: Patient has not been observed to be checking the exit doors or demonstrating behaviors of attempting to leave the unit.     Problem: Ida  Goal: Will exhibit normal sleep and speech and no impulsivity  Description: INTERVENTIONS:  1. Administer medication as ordered  2. Set limits on impulsive behavior  3. Make attempts to decrease external stimuli as possible  5/7/2024 0001 by Ainsley Glass, RN  Outcome: Progressing  Note: Patient states he feels his sleep pattern is improving. Patient noted scratching but only during staff assessment.  5/6/2024 1643 by Colleen Barry, RN  Outcome: Progressing  Note: Sleeping on and off all shift, verbalizes feeling depressed, states \"I'm sad and sleeping and that's how they want we.\"     Problem: Psychosis  Goal: Will report no hallucinations or delusions  Description: INTERVENTIONS:  1. Administer medication as  ordered  2. Assist with reality testing to support increasing orientation  3. Assess if patient's hallucinations or delusions are encouraging self harm or harm to others and intervene as appropriate  5/7/2024 0001 by Ainsley Glass, RN  Outcome: Progressing  Note: Patient denies any hallucinations at present. Patient states he continues to feel paranoid and suspicious that he will end up alone and destitute.   5/6/2024 1643 by Jhonny 
  Problem: Risk for Elopement  Goal: Patient will not exit the unit/facility without proper excort  5/7/2024 1314 by Ninoska Villalba RN  Outcome: Progressing  Flowsheets (Taken 5/7/2024 1226)  Nursing Interventions for Elopement Risk:   Make sure patient has all necessary personal care items   Reduce environmental triggers  Note: Patient has not been observed to be checking the exit doors or demonstrating behaviors of attempting to leave the unit.       Problem: Ida  Goal: Will exhibit normal sleep and speech and no impulsivity  Description: INTERVENTIONS:  1. Administer medication as ordered  2. Set limits on impulsive behavior  3. Make attempts to decrease external stimuli as possible  5/7/2024 1314 by Ninoska Villalba RN  Outcome: Progressing  Note: Patient slept 8.5 hours continuously last night. No impulsive behaviors noted so far this shift.      Problem: Psychosis  Goal: Will report no hallucinations or delusions  Description: INTERVENTIONS:  1. Administer medication as  ordered  2. Assist with reality testing to support increasing orientation  3. Assess if patient's hallucinations or delusions are encouraging self harm or harm to others and intervene as appropriate  5/7/2024 1314 by Ninoska Villalba RN  Outcome: Progressing  Note: Denies any hallucination. No noted delusions so far this shift.      Problem: Anxiety  Goal: Will report anxiety at manageable levels  Description: INTERVENTIONS:  1. Administer medication as ordered  2. Teach and rehearse alternative coping skills  3. Provide emotional support with 1:1 interaction with staff  5/7/2024 1314 by Ninoska Villalba RN  Outcome: Progressing  Flowsheets (Taken 5/7/2024 1226)  Will report anxiety at manageable levels:   Provide emotional support with 1:1 interaction with staff   Administer medication as ordered  Note: Reports some anxiety. Was given atarax with morning medications     Problem: Sleep Disturbance  Goal: Will exhibit 
  Problem: Risk for Elopement  Goal: Patient will not exit the unit/facility without proper excort  Outcome: Progressing  Note: Patient has not been observed to be checking the exit doors or demonstrating behaviors of attempting to leave the unit.     Problem: Ida  Goal: Will exhibit normal sleep and speech and no impulsivity  Description: INTERVENTIONS:  1. Administer medication as ordered  2. Set limits on impulsive behavior  3. Make attempts to decrease external stimuli as possible  Outcome: Progressing  Note: Sleeping on and off all shift, verbalizes feeling depressed, states \"I'm sad and sleeping and that's how they want we.\"     Problem: Psychosis  Goal: Will report no hallucinations or delusions  Description: INTERVENTIONS:  1. Administer medication as  ordered  2. Assist with reality testing to support increasing orientation  3. Assess if patient's hallucinations or delusions are encouraging self harm or harm to others and intervene as appropriate  Outcome: Progressing  Note: No hallucinations observed      Problem: Anxiety  Goal: Will report anxiety at manageable levels  Description: INTERVENTIONS:  1. Administer medication as ordered  2. Teach and rehearse alternative coping skills  3. Provide emotional support with 1:1 interaction with staff  Outcome: Progressing  Flowsheets (Taken 5/6/2024 1624)  Will report anxiety at manageable levels: Administer medication as ordered  Note: Denies feeling anxious.     Problem: Drug Abuse/Detox  Goal: Will have no detox symptoms and will verbalize plan for changing drug-related behavior  Description: INTERVENTIONS:  1. Administer medication as ordered  2. Monitor physical status  3. Provide emotional support with 1:1 interaction with staff  4. Encourage  recovery focused treatment   Outcome: Progressing  Flowsheets  Taken 5/6/2024 1624  Will have no detox symptoms and will verbalize plan for changing drug-related behavior: Administer medication as ordered  Taken 
anxiety/agitation-pt compliant     Problem: Involuntary Admit  Goal: Will cooperate with staff recommendations and doctor's orders and will demonstrate appropriate behavior  Description: INTERVENTIONS:  1. Treat underlying conditions and offer medication as ordered  2. Educate regarding involuntary admission procedures and rules  3. Contain excessive/inappropriate behavior per unit and hospital policies  5/5/2024 0445 by Sobeida Rivera, RN  Outcome: Not Progressing  Note: Patient took his medication when he woke up but dismissive of treatment plan and most of assessment  5/4/2024 1514 by Nayeli Foy RN  Outcome: Progressing  Flowsheets (Taken 5/4/2024 1514)  Will cooperate with staff recommendations and doctor's orders and will demonstrate appropriate behavior: Treat underlying conditions and offer medication as ordered  Note: Pt is on the fringe with staff and peers, he is able to be redirected this shift. Compliant with medications and apologized with physician for his negative behaviors yesterday     Problem: Behavior  Goal: Pt/Family maintain appropriate behavior and adhere to behavioral management agreement, if implemented  Description: INTERVENTIONS:  1. Assess patient/family's coping skills and  non-compliant behavior (including use of illegal substances)  2. Notify security of behavior or suspected illegal substances which indicate the need for search of the family and/or belongings  3. Encourage verbalization of thoughts and concerns in a socially appropriate manner  4. Utilize positive, consistent limit setting strategies supporting safety of patient, staff and others  5. Encourage participation in the decision making process about the behavioral management agreement  6. If a visitor's behavior poses a threat to safety call refer to organization policy.  7. Initiate consult with , Psychosocial CNS, Spiritual Care as appropriate  5/5/2024 0445 by Sobeida Rivera, RN  Outcome: Not 
present.  5/7/2024 1314 by Ninoska Villalba RN  Outcome: Progressing  Note: Denies any hallucination. No noted delusions so far this shift.      Problem: Anxiety  Goal: Will report anxiety at manageable levels  Description: INTERVENTIONS:  1. Administer medication as ordered  2. Teach and rehearse alternative coping skills  3. Provide emotional support with 1:1 interaction with staff  5/8/2024 0015 by Ainsley Glass RN  Outcome: Progressing  Flowsheets  Taken 5/8/2024 0015  Will report anxiety at manageable levels: Teach and rehearse alternative coping skills  Taken 5/8/2024 0009  Will report anxiety at manageable levels: Provide emotional support with 1:1 interaction with staff  Note: Patient verbalizes some anxiety this shift. Patient denied the need for medication.  5/7/2024 1314 by Ninoska Villalba RN  Outcome: Progressing  Flowsheets (Taken 5/7/2024 1226)  Will report anxiety at manageable levels:   Provide emotional support with 1:1 interaction with staff   Administer medication as ordered  Note: Reports some anxiety. Was given atarax with morning medications     Problem: Drug Abuse/Detox  Goal: Will have no detox symptoms and will verbalize plan for changing drug-related behavior  Description: INTERVENTIONS:  1. Administer medication as ordered  2. Monitor physical status  3. Provide emotional support with 1:1 interaction with staff  4. Encourage  recovery focused treatment   Recent Flowsheet Documentation  Taken 5/8/2024 0009 by Ainsley Glass RN  Will have no detox symptoms and will verbalize plan for changing drug-related behavior: Provide emotional support with 1:1 interaction with staff  5/7/2024 1314 by Ninoska Villalba RN  Outcome: Completed  Flowsheets (Taken 5/7/2024 1226)  Will have no detox symptoms and will verbalize plan for changing drug-related behavior: Provide emotional support with 1:1 interaction with staff  Note: No detox s/s.     Problem: Sleep Disturbance  Goal: 
RN  Outcome: Progressing  Flowsheets  Taken 5/5/2024 1116  Will report anxiety at manageable levels:   Administer medication as ordered   Provide emotional support with 1:1 interaction with staff  Taken 5/5/2024 0952  Will report anxiety at manageable levels: Provide emotional support with 1:1 interaction with staff  Note: Medicated per pt request for anxiety returned with pressured loud speech pt appearing agitated and was medicated see MAR  5/5/2024 0445 by Sobeida Rivera, RN  Outcome: Not Progressing  Note: Patient reports mood is angry with high anxiety and depression due to being in hospital     Problem: Involuntary Admit  Goal: Will cooperate with staff recommendations and doctor's orders and will demonstrate appropriate behavior  Description: INTERVENTIONS:  1. Treat underlying conditions and offer medication as ordered  2. Educate regarding involuntary admission procedures and rules  3. Contain excessive/inappropriate behavior per unit and hospital policies  5/5/2024 1116 by Nayeli Foy, RN  Outcome: Progressing  Flowsheets (Taken 5/5/2024 0952)  Will cooperate with staff recommendations and doctor's orders and will demonstrate appropriate behavior: Treat underlying conditions and offer medication as ordered  Note: Cooperating with staff, heard apologizing to his grandmother on the phone for his negative behaviors  5/5/2024 0445 by Sobeida Rivera, RN  Outcome: Not Progressing  Note: Patient took his medication when he woke up but dismissive of treatment plan and most of assessment     Problem: Behavior  Goal: Pt/Family maintain appropriate behavior and adhere to behavioral management agreement, if implemented  Description: INTERVENTIONS:  1. Assess patient/family's coping skills and  non-compliant behavior (including use of illegal substances)  2. Notify security of behavior or suspected illegal substances which indicate the need for search of the family and/or belongings  3. Encourage